# Patient Record
Sex: MALE | Race: BLACK OR AFRICAN AMERICAN | NOT HISPANIC OR LATINO | Employment: FULL TIME | URBAN - METROPOLITAN AREA
[De-identification: names, ages, dates, MRNs, and addresses within clinical notes are randomized per-mention and may not be internally consistent; named-entity substitution may affect disease eponyms.]

---

## 2017-01-04 ENCOUNTER — ANESTHESIA EVENT (OUTPATIENT)
Dept: SURGERY | Facility: OTHER | Age: 52
End: 2017-01-04
Payer: COMMERCIAL

## 2017-01-04 ENCOUNTER — HOSPITAL ENCOUNTER (OUTPATIENT)
Dept: PREADMISSION TESTING | Facility: OTHER | Age: 52
Discharge: HOME | End: 2017-01-04
Attending: SPECIALIST
Payer: COMMERCIAL

## 2017-01-04 VITALS
OXYGEN SATURATION: 97 % | DIASTOLIC BLOOD PRESSURE: 80 MMHG | HEART RATE: 88 BPM | HEIGHT: 69 IN | BODY MASS INDEX: 34.8 KG/M2 | TEMPERATURE: 99 F | SYSTOLIC BLOOD PRESSURE: 142 MMHG | WEIGHT: 235 LBS | RESPIRATION RATE: 16 BRPM

## 2017-01-04 DIAGNOSIS — Q82.8 ACCESSORY SKIN TAGS: ICD-10-CM

## 2017-01-04 DIAGNOSIS — D48.7 NEOPLASM OF UNCERTAIN BEHAVIOR OF NECK: ICD-10-CM

## 2017-01-04 LAB
ANION GAP SERPL CALC-SCNC: 9 MMOL/L
BASOPHILS # BLD AUTO: 0.02 K/UL
BASOPHILS NFR BLD: 0.3 %
BUN SERPL-MCNC: 12 MG/DL
CALCIUM SERPL-MCNC: 9.6 MG/DL
CHLORIDE SERPL-SCNC: 103 MMOL/L
CO2 SERPL-SCNC: 27 MMOL/L
CREAT SERPL-MCNC: 1.1 MG/DL
DIFFERENTIAL METHOD: NORMAL
EOSINOPHIL # BLD AUTO: 0.2 K/UL
EOSINOPHIL NFR BLD: 3.7 %
ERYTHROCYTE [DISTWIDTH] IN BLOOD BY AUTOMATED COUNT: 13.2 %
EST. GFR  (AFRICAN AMERICAN): >60 ML/MIN/1.73 M^2
EST. GFR  (NON AFRICAN AMERICAN): >60 ML/MIN/1.73 M^2
GLUCOSE SERPL-MCNC: 84 MG/DL
HCT VFR BLD AUTO: 48.1 %
HGB BLD-MCNC: 17 G/DL
LYMPHOCYTES # BLD AUTO: 1.3 K/UL
LYMPHOCYTES NFR BLD: 20.2 %
MCH RBC QN AUTO: 30.1 PG
MCHC RBC AUTO-ENTMCNC: 35.3 %
MCV RBC AUTO: 85 FL
MONOCYTES # BLD AUTO: 0.5 K/UL
MONOCYTES NFR BLD: 7.7 %
NEUTROPHILS # BLD AUTO: 4.4 K/UL
NEUTROPHILS NFR BLD: 67.9 %
PLATELET # BLD AUTO: 164 K/UL
PMV BLD AUTO: 11.2 FL
POTASSIUM SERPL-SCNC: 3.7 MMOL/L
RBC # BLD AUTO: 5.64 M/UL
SODIUM SERPL-SCNC: 139 MMOL/L
WBC # BLD AUTO: 6.48 K/UL

## 2017-01-04 PROCEDURE — 36415 COLL VENOUS BLD VENIPUNCTURE: CPT

## 2017-01-04 PROCEDURE — 80048 BASIC METABOLIC PNL TOTAL CA: CPT

## 2017-01-04 PROCEDURE — 85025 COMPLETE CBC W/AUTO DIFF WBC: CPT

## 2017-01-04 RX ORDER — WARFARIN 2.5 MG/1
2.5 TABLET ORAL
Status: ON HOLD | COMMUNITY
End: 2017-11-02 | Stop reason: HOSPADM

## 2017-01-04 RX ORDER — FAMOTIDINE 10 MG/1
10 TABLET ORAL 2 TIMES DAILY
COMMUNITY
End: 2017-08-30

## 2017-01-04 RX ORDER — TRAMADOL HYDROCHLORIDE 50 MG/1
50 TABLET ORAL EVERY 6 HOURS PRN
Status: ON HOLD | COMMUNITY
End: 2017-11-02 | Stop reason: HOSPADM

## 2017-01-04 RX ORDER — SODIUM CHLORIDE, SODIUM LACTATE, POTASSIUM CHLORIDE, CALCIUM CHLORIDE 600; 310; 30; 20 MG/100ML; MG/100ML; MG/100ML; MG/100ML
INJECTION, SOLUTION INTRAVENOUS CONTINUOUS
Status: CANCELLED | OUTPATIENT
Start: 2017-01-04

## 2017-01-04 RX ORDER — LIDOCAINE HYDROCHLORIDE 10 MG/ML
1 INJECTION, SOLUTION EPIDURAL; INFILTRATION; INTRACAUDAL; PERINEURAL ONCE
Status: CANCELLED | OUTPATIENT
Start: 2017-01-04 | End: 2017-01-04

## 2017-01-04 RX ORDER — WARFARIN SODIUM 5 MG/1
5 TABLET ORAL
Status: ON HOLD | COMMUNITY
End: 2017-11-02 | Stop reason: HOSPADM

## 2017-01-04 NOTE — IP AVS SNAPSHOT
[facility] Location ([place]) [address] [address]  Phone: [phone]           I have received a copy of my After Visit Summary and discharge instructions from [facility].    INSTRUCTIONS RECEIVED AND UNDERSTOOD BY:                     Patient/Patient Representative: ________________________________________________________________     Date/Time: ________________________________________________________________                     Instructions Given By: ________________________________________________________________     Date/Time: ________________________________________________________________

## 2017-01-04 NOTE — ANESTHESIA PREPROCEDURE EVALUATION
01/04/2017  Rangel Garcia is a 51 y.o., male.    [facility] Anesthesia Evaluation    I have reviewed the Patient Summary Reports.    I have reviewed the Nursing Notes.   I have reviewed the Medications.     Review of Systems  Anesthesia Hx:  Denies Family Hx of Anesthesia complications.   Denies Personal Hx of Anesthesia complications.   Social:  Non-Smoker    Hematology/Oncology:  Hematology Normal   Oncology Normal     EENT/Dental:EENT/Dental Normal   Cardiovascular:   Hypertension DVT's tx with chronic coumadin. Has ariel filter in place. On lovenox bridge pre-op   Pulmonary:  Pulmonary Normal    Renal/:  Renal/ Normal     Hepatic/GI:   GERD    Musculoskeletal:  Musculoskeletal Normal    Neurological:   Hypersensitivity on L arm following ulnar n surgery   Endocrine:  Endocrine Normal    Dermatological:  Skin Normal    Psych:  Psychiatric Normal           Physical Exam  General:  Well nourished    Airway/Jaw/Neck:  Airway Findings: Mouth Opening: Normal Mallampati: I  TM Distance: Normal, at least 6 cm  Jaw/Neck Findings:  Neck ROM: Extension Decreased, Mild      Dental:  Dental Findings: In tact        Mental Status:  Mental Status Findings:  Cooperative, Alert and Oriented         Anesthesia Plan  Type of Anesthesia, risks & benefits discussed:  Anesthesia Type:  general  Patient's Preference:   Intra-op Monitoring Plan:   Intra-op Monitoring Plan Comments:   Post Op Pain Control Plan:   Post Op Pain Control Plan Comments:   Induction:    Beta Blocker:         Informed Consent: Patient understands risks and agrees with Anesthesia plan.  Questions answered. Anesthesia consent signed with patient.  ASA Score: 3     Day of Surgery Review of History & Physical:    H&P update referred to the surgeon.     Anesthesia Plan Notes: Labs ordered today in [facility]    Consider avoiding L arm for BP cuff due to  hypersensitivity there    ?prone?        Ready For Surgery From Anesthesia Perspective.

## 2017-01-04 NOTE — DISCHARGE INSTRUCTIONS
PRE-ADMIT TESTING -  [phone]    [address]  [facility]        OUTPATIENT SURGERY UNIT - [phone]    Your surgery has been scheduled at [facility]. We are pleased to have the opportunity to serve you. For Further Information please call [phone].    On the day of surgery please report to the Information Desk on the 1st floor.    CONTACT YOUR PHYSICIAN'S OFFICE THE DAY PRIOR TO YOUR SURGERY TO OBTAIN YOUR ARRIVAL TIME.     The evening before surgery do not eat anything after 9 p.m. ( this includes hard candy, chewing gum and mints).  You may have GATORADE, POWERADE AND WATER FROM 9 p.m. until leaving home to come to the hospital.   DO NOT DRINK ANY LIQUIDS ON THE WAY TO THE HOSPITAL.     SPECIAL MEDICATION INSTRUCTIONS: TAKE medications checked off by the Anesthesiologist on your Medication List.    Angiogram Patients: Take medications as instructed by your physician, including aspirin.     Surgery Patients:    If you take ASPIRIN - Your PHYSICIAN/SURGEON will need to inform you IF/OR when you need to stop taking aspirin prior to your surgery.     Do Not take any medications containing IBUPROFEN.  Do Not Wear any make-up ( especially eye make-up) or dark nail polish.    Do not shave your surgical area at least 5 days prior to your surgery. The surgical prep will be performed at the hospital according to Infection Control regulations.    Leave all valuables at home.   Do Not wear any jewelry or watches, including any metal in body piercings.  Contact Lens must be removed before surgery. Either do not wear the contact lens or bring a case and solution for storage.  Please bring a container for eyeglasses or dentures as required.  Bring any paperwork your physician has provided, such as consent forms,  history and physicals, doctor's orders, etc.   Bring comfortable clothes that are loose fitting to wear upon discharge. Take into consideration the type of surgery being  performed.  Maintain your diet as advised per your physician the day prior to surgery.  Adequate rest the night before surgery is advised.   Park in the Parking lot behind the hospital or in the [place] across the street from the parking lot. Parking is complimentary.  If you will be discharged the same day as your procedure, please arrange for a responsible adult to drive you home or to accompany you if traveling by taxi.   YOU WILL NOT BE PERMITTED TO DRIVE OR TO LEAVE THE HOSPITAL ALONE AFTER SURGERY.   It is strongly recommended that you arrange for someone to remain with you for the first 24 hrs following your surgery.       Thank you for your cooperation.  The Staff of [facility].        Bathing Instructions                                                                 Please shower the evening before and morning of your procedure with    ANTIBACTERIAL SOAP. ( DIAL, etc )  Concentrate on the surgical area   for at least 3 minutes and rinse completely. Dry off as usual.                            No lotions or creams.

## 2017-01-04 NOTE — IP AVS SNAPSHOT
[facility] Location ([place])  [address] [place] [address]  Phone: [phone]           Patient Discharge Instructions    Our goal is to set you up for success. This packet includes information on your condition, medications, and your home care. It will help you to care for yourself so you don't get sicker.     Please ask your nurse if you have any questions.        There are many details to remember when preparing for your surgery. Here is what you will need to do, please ask your nurse if there are more specific instructions and if you have any questions:    1. 24 hours before procedure Do not smoke or drink alcoholic beverages 24 hours prior to your procedure    2. Eating before procedure Do not eat or drink anything 8 hours before your procedure - this includes gum, mints, and candy.     3. Day of procedure Please remove all jewelry for the procedure. If you wear contact lenses, dentures, hearing aids or glasses, bring a container to put them in during your surgery and give to a family member for safekeeping.  If your doctor has scheduled you for an overnight stay, bring a small overnight bag with any personal items that you need.    4. After procedure Make arrangements in advance for transportation home by a responsible adult. It is not safe to drive a vehicle during the 24 hours following surgery.     PLEASE NOTE: You may be contacted the day before your surgery to confirm your surgery date and arrival time. The Surgery schedule has many variables which may affect the time of your surgery case. Family members should be available if your surgery time changes.                ** Verify the list of medication(s) below is accurate and up to date. Carry this with you in case of emergency. If your medications have changed, please notify your healthcare provider.             Medication List      TAKE these medications        Additional Info                      acyclovir 200 MG capsule    Commonly known as:  ZOVIRAX   Refills:  0      Begin Date    AM    Noon    PM    Bedtime       amlodipine 5 MG tablet   Commonly known as:  NORVASC   Refills:  0   Dose:  5 mg    Instructions:  Take 5 mg by mouth once daily.     Begin Date    AM    Noon    PM    Bedtime       DEPO-TESTOSTERONE 200 mg/mL injection   Refills:  0   Generic drug:  testosterone cypionate    Instructions:  INJECT 1 ML EVERY OTHER WEEK     Begin Date    AM    Noon    PM    Bedtime       enoxaparin 60 mg/0.6 mL Syrg   Commonly known as:  LOVENOX   Quantity:  0.6 mL   Refills:  1   Dose:  60 mg    Instructions:  Inject 0.6 mLs (60 mg total) into the skin every 12 (twelve) hours.     Begin Date    AM    Noon    PM    Bedtime       famotidine 10 MG tablet   Commonly known as:  PEPCID   Refills:  0   Dose:  10 mg    Instructions:  Take 10 mg by mouth 2 (two) times daily.     Begin Date    AM    Noon    PM    Bedtime       gabapentin 300 MG capsule   Commonly known as:  NEURONTIN   Refills:  0   Dose:  300 mg    Instructions:  Take 300 mg by mouth 2 (two) times daily.     Begin Date    AM    Noon    PM    Bedtime       methocarbamol 750 MG Tab   Commonly known as:  ROBAXIN   Refills:  0   Dose:  500 mg    Instructions:  Take 500 mg by mouth 4 (four) times daily.     Begin Date    AM    Noon    PM    Bedtime       nebivolol 5 MG Tab   Commonly known as:  BYSTOLIC   Refills:  0   Dose:  10 mg    Instructions:  Take 10 mg by mouth once daily.     Begin Date    AM    Noon    PM    Bedtime       PENNSAID 20 mg/gram /actuation(2 %) Sopm   Refills:  3   Generic drug:  diclofenac sodium    Instructions:  APPLY TWO PUMPS TO THE AFFECTED KNEE TWICE DAILY.     Begin Date    AM    Noon    PM    Bedtime       tramadol 50 mg tablet   Commonly known as:  ULTRAM   Refills:  0   Dose:  50 mg    Instructions:  Take 50 mg by mouth every 6 (six) hours as needed for Pain.     Begin Date    AM    Noon    PM    Bedtime       valsartan 40 MG tablet   Commonly known  as:  DIOVAN   Refills:  0   Dose:  25 mg    Instructions:  Take 25 mg by mouth once daily.     Begin Date    AM    Noon    PM    Bedtime       * warfarin 5 MG tablet   Commonly known as:  COUMADIN   Refills:  0   Dose:  5 mg   Indications:  Deep Venous Thrombosis    Instructions:  Take 5 mg by mouth every Tues, Thurs, Sat. q day / sat 2.5mg     Begin Date    AM    Noon    PM    Bedtime       * warfarin 2.5 MG tablet   Commonly known as:  COUMADIN   Refills:  0   Dose:  2.5 mg    Instructions:  Take 2.5 mg by mouth every Saturday.     Begin Date    AM    Noon    PM    Bedtime       * Notice:  This list has 2 medication(s) that are the same as other medications prescribed for you. Read the directions carefully, and ask your doctor or other care provider to review them with you.               Please bring to all follow up appointments:    1. A copy of your discharge instructions.  2. All medicines you are currently taking in their original bottles.  3. Identification and insurance card.    Please arrive 15 minutes ahead of scheduled appointment time.    Please call 24 hours in advance if you must reschedule your appointment and/or time.        Your Future Surgeries/Procedures     Jan 06, 2017   Surgery with Nathan Choudhury Jr., MD   [facility] ([facility])    [address] [place] [id number] [phone]                  Discharge Instructions       PRE-ADMIT TESTING -  [phone]    [address] [place] [address]        OUTPATIENT SURGERY UNIT - [phone]    Your surgery has been scheduled at [facility]. We are pleased to have the opportunity to serve you. For Further Information please call [phone].    On the day of surgery please report to the Information Desk on the 1st floor.    CONTACT YOUR PHYSICIAN'S OFFICE THE DAY PRIOR TO YOUR SURGERY TO OBTAIN YOUR ARRIVAL TIME.     The evening before surgery do not eat anything after 9 p.m. ( this  includes hard candy, chewing gum and mints).  You may have GATORADE, POWERADE AND WATER FROM 9 p.m. until leaving home to come to the hospital.   DO NOT DRINK ANY LIQUIDS ON THE WAY TO THE HOSPITAL.     SPECIAL MEDICATION INSTRUCTIONS: TAKE medications checked off by the Anesthesiologist on your Medication List.    Angiogram Patients: Take medications as instructed by your physician, including aspirin.     Surgery Patients:    If you take ASPIRIN - Your PHYSICIAN/SURGEON will need to inform you IF/OR when you need to stop taking aspirin prior to your surgery.     Do Not take any medications containing IBUPROFEN.  Do Not Wear any make-up ( especially eye make-up) or dark nail polish.    Do not shave your surgical area at least 5 days prior to your surgery. The surgical prep will be performed at the hospital according to Infection Control regulations.    Leave all valuables at home.   Do Not wear any jewelry or watches, including any metal in body piercings.  Contact Lens must be removed before surgery. Either do not wear the contact lens or bring a case and solution for storage.  Please bring a container for eyeglasses or dentures as required.  Bring any paperwork your physician has provided, such as consent forms,  history and physicals, doctor's orders, etc.   Bring comfortable clothes that are loose fitting to wear upon discharge. Take into consideration the type of surgery being performed.  Maintain your diet as advised per your physician the day prior to surgery.  Adequate rest the night before surgery is advised.   Park in the Parking lot behind the hospital or in the [place] [facility] across the street from the parking lot. Parking is complimentary.  If you will be discharged the same day as your procedure, please arrange for a responsible adult to drive you home or to accompany you if traveling by taxi.   YOU WILL NOT BE PERMITTED TO DRIVE OR TO LEAVE THE HOSPITAL ALONE AFTER SURGERY.   It is strongly  "recommended that you arrange for someone to remain with you for the first 24 hrs following your surgery.       Thank you for your cooperation.  The Staff of [facility].        Bathing Instructions                                                                 Please shower the evening before and morning of your procedure with    ANTIBACTERIAL SOAP. ( DIAL, etc )  Concentrate on the surgical area   for at least 3 minutes and rinse completely. Dry off as usual.                            No lotions or creams.                        Admission Information     Date & Time Provider Department CSN    1/4/2017 10:30 AM Nathan Choudhury Jr., MD [facility] [id number]      Care Providers     Provider Role Specialty Primary office phone    Nathan Choudhury Jr., MD Attending Provider Vascular Surgery [phone]      Your Vitals Were     BP Pulse Temp Resp Height Weight    142/80 88 98.5 °F (36.9 °C) 16 5' 9" (1.753 m) 106.6 kg (235 lb)    SpO2 BMI             97% 34.7 kg/m2         Recent Lab Values     No lab values to display.      Allergies as of 1/4/2017        Reactions    Morphine Itching      [facility]     [facility] Nurse Care Line - 24/7 Assistance  Unless otherwise directed by your provider, please contact [facility], our nurse care line that is available for 24/7 assistance.     Registered nurses in the [facility] provide clinical advisement, health education, appointment booking, and other advisory services.  Call for this free service at [phone].        Advance Directives     An advance directive is a document which, in the event you are no longer able to make decisions for yourself, tells your healthcare team what kind of treatment you do or do not want to receive, or who you would like to make those decisions for you.  If you do not currently have an advance directive, [facility] encourages you to create one.  For more information call:  [phone]), [phone] ([phone]),  or log on to [email or web address].        Language Assistance Services     ATTENTION: Language assistance services are available, free of charge. Please call [phone].      ATENCIÓN: Si habla ilir, tiene a pollock disposición servicios gratuitos de asistencia lingüística. Llame al [phone].     CHÚ Ý: N?u b?n nói Ti?ng Vi?t, có các d?ch v? h? tr? ngôn ng? mi?n phí dành cho b?n. G?i s? [phone].        Coumadin Discharge Instructions                         [facility] Sign-Up     Activating your [facility] account is as easy as 1-2-3!     1) Visit [email or web address], select Sign Up Now, enter this activation code and your date of birth, then select Next.  [email or web address]  Expires: 2/18/2017 11:19 AM      2) Create a username and password to use when you visit [facility] in the future and select a security question in case you lose your password and select Next.    3) Enter your e-mail address and click Sign Up!    Additional Information  If you have questions, please e-mail [email or web address] or call [phone] to talk to our [facility] staff. Remember, [facility] is NOT to be used for urgent needs. For medical emergencies, dial 911.          [facility] complies with applicable Federal civil rights laws and does not discriminate on the basis of race, color, national origin, age, disability, or sex.

## 2017-01-06 ENCOUNTER — SURGERY (OUTPATIENT)
Age: 52
End: 2017-01-06

## 2017-01-06 ENCOUNTER — ANESTHESIA (OUTPATIENT)
Dept: SURGERY | Facility: OTHER | Age: 52
End: 2017-01-06
Payer: COMMERCIAL

## 2017-01-06 PROBLEM — D48.7 NEOPLASM OF UNCERTAIN BEHAVIOR OF NECK: Status: ACTIVE | Noted: 2017-01-06

## 2017-01-06 PROCEDURE — 25000003 PHARM REV CODE 250: Performed by: NURSE ANESTHETIST, CERTIFIED REGISTERED

## 2017-01-06 PROCEDURE — 63600175 PHARM REV CODE 636 W HCPCS: Performed by: SPECIALIST

## 2017-01-06 PROCEDURE — 25000003 PHARM REV CODE 250: Performed by: ANESTHESIOLOGY

## 2017-01-06 PROCEDURE — 63600175 PHARM REV CODE 636 W HCPCS: Performed by: NURSE ANESTHETIST, CERTIFIED REGISTERED

## 2017-01-06 RX ORDER — LIDOCAINE HCL/PF 100 MG/5ML
SYRINGE (ML) INTRAVENOUS
Status: DISCONTINUED | OUTPATIENT
Start: 2017-01-06 | End: 2017-01-06

## 2017-01-06 RX ORDER — NEOSTIGMINE METHYLSULFATE 1 MG/ML
INJECTION, SOLUTION INTRAVENOUS
Status: DISCONTINUED | OUTPATIENT
Start: 2017-01-06 | End: 2017-01-06

## 2017-01-06 RX ORDER — PHENYLEPHRINE HYDROCHLORIDE 10 MG/ML
INJECTION INTRAVENOUS
Status: DISCONTINUED | OUTPATIENT
Start: 2017-01-06 | End: 2017-01-06

## 2017-01-06 RX ORDER — DEXAMETHASONE SODIUM PHOSPHATE 4 MG/ML
INJECTION, SOLUTION INTRA-ARTICULAR; INTRALESIONAL; INTRAMUSCULAR; INTRAVENOUS; SOFT TISSUE
Status: DISCONTINUED | OUTPATIENT
Start: 2017-01-06 | End: 2017-01-06

## 2017-01-06 RX ORDER — FENTANYL CITRATE 50 UG/ML
INJECTION, SOLUTION INTRAMUSCULAR; INTRAVENOUS
Status: DISCONTINUED | OUTPATIENT
Start: 2017-01-06 | End: 2017-01-06

## 2017-01-06 RX ORDER — PROPOFOL 10 MG/ML
VIAL (ML) INTRAVENOUS
Status: DISCONTINUED | OUTPATIENT
Start: 2017-01-06 | End: 2017-01-06

## 2017-01-06 RX ORDER — GLYCOPYRROLATE 0.2 MG/ML
INJECTION INTRAMUSCULAR; INTRAVENOUS
Status: DISCONTINUED | OUTPATIENT
Start: 2017-01-06 | End: 2017-01-06

## 2017-01-06 RX ORDER — ROCURONIUM BROMIDE 10 MG/ML
INJECTION, SOLUTION INTRAVENOUS
Status: DISCONTINUED | OUTPATIENT
Start: 2017-01-06 | End: 2017-01-06

## 2017-01-06 RX ORDER — ACETAMINOPHEN 10 MG/ML
INJECTION, SOLUTION INTRAVENOUS
Status: DISCONTINUED | OUTPATIENT
Start: 2017-01-06 | End: 2017-01-06

## 2017-01-06 RX ORDER — MIDAZOLAM HYDROCHLORIDE 1 MG/ML
INJECTION INTRAMUSCULAR; INTRAVENOUS
Status: DISCONTINUED | OUTPATIENT
Start: 2017-01-06 | End: 2017-01-06

## 2017-01-06 RX ORDER — ONDANSETRON 2 MG/ML
INJECTION INTRAMUSCULAR; INTRAVENOUS
Status: DISCONTINUED | OUTPATIENT
Start: 2017-01-06 | End: 2017-01-06

## 2017-01-06 RX ADMIN — GLYCOPYRROLATE 0.2 MG: 0.2 INJECTION, SOLUTION INTRAMUSCULAR; INTRAVENOUS at 07:01

## 2017-01-06 RX ADMIN — LIDOCAINE HYDROCHLORIDE 75 MG: 20 INJECTION, SOLUTION INTRAVENOUS at 07:01

## 2017-01-06 RX ADMIN — FENTANYL CITRATE 100 MCG: 50 INJECTION, SOLUTION INTRAMUSCULAR; INTRAVENOUS at 07:01

## 2017-01-06 RX ADMIN — PHENYLEPHRINE HYDROCHLORIDE 100 MCG: 10 INJECTION INTRAVENOUS at 08:01

## 2017-01-06 RX ADMIN — ACETAMINOPHEN 1000 MG: 10 INJECTION, SOLUTION INTRAVENOUS at 07:01

## 2017-01-06 RX ADMIN — ROCURONIUM BROMIDE 50 MG: 10 INJECTION, SOLUTION INTRAVENOUS at 07:01

## 2017-01-06 RX ADMIN — PROPOFOL 25 MG: 10 INJECTION, EMULSION INTRAVENOUS at 07:01

## 2017-01-06 RX ADMIN — GLYCOPYRROLATE 0.8 MG: 0.2 INJECTION, SOLUTION INTRAMUSCULAR; INTRAVENOUS at 08:01

## 2017-01-06 RX ADMIN — FENTANYL CITRATE 50 MCG: 50 INJECTION, SOLUTION INTRAMUSCULAR; INTRAVENOUS at 07:01

## 2017-01-06 RX ADMIN — MIDAZOLAM HYDROCHLORIDE 2 MG: 1 INJECTION, SOLUTION INTRAMUSCULAR; INTRAVENOUS at 07:01

## 2017-01-06 RX ADMIN — PHENYLEPHRINE HYDROCHLORIDE 200 MCG: 10 INJECTION INTRAVENOUS at 08:01

## 2017-01-06 RX ADMIN — CEFAZOLIN SODIUM 2 G: 2 SOLUTION INTRAVENOUS at 07:01

## 2017-01-06 RX ADMIN — PROPOFOL 175 MG: 10 INJECTION, EMULSION INTRAVENOUS at 07:01

## 2017-01-06 RX ADMIN — LIDOCAINE HYDROCHLORIDE 25 MG: 20 INJECTION, SOLUTION INTRAVENOUS at 07:01

## 2017-01-06 RX ADMIN — BUPIVACAINE HYDROCHLORIDE AND EPINEPHRINE BITARTRATE 20 ML: 2.5; .005 INJECTION, SOLUTION INFILTRATION; PERINEURAL at 08:01

## 2017-01-06 RX ADMIN — DEXAMETHASONE SODIUM PHOSPHATE 8 MG: 4 INJECTION, SOLUTION INTRAMUSCULAR; INTRAVENOUS at 07:01

## 2017-01-06 RX ADMIN — FENTANYL CITRATE 100 MCG: 50 INJECTION, SOLUTION INTRAMUSCULAR; INTRAVENOUS at 09:01

## 2017-01-06 RX ADMIN — CARBOXYMETHYLCELLULOSE SODIUM 2 DROP: 2.5 SOLUTION/ DROPS OPHTHALMIC at 07:01

## 2017-01-06 RX ADMIN — ONDANSETRON 8 MG: 2 INJECTION INTRAMUSCULAR; INTRAVENOUS at 07:01

## 2017-01-06 RX ADMIN — NEOSTIGMINE METHYLSULFATE 5 MG: 1 INJECTION INTRAVENOUS at 08:01

## 2017-01-06 RX ADMIN — SODIUM CHLORIDE, SODIUM LACTATE, POTASSIUM CHLORIDE, AND CALCIUM CHLORIDE: 600; 310; 30; 20 INJECTION, SOLUTION INTRAVENOUS at 06:01

## 2017-01-06 RX ADMIN — FENTANYL CITRATE 100 MCG: 50 INJECTION, SOLUTION INTRAMUSCULAR; INTRAVENOUS at 08:01

## 2017-01-06 NOTE — TRANSFER OF CARE
"Anesthesia Transfer of Care Note    Patient: Rangel Garcia    Procedure(s) Performed: Procedure(s):  EXCISION-MASS BACK OF NECK    Patient location: PACU    Anesthesia Type: general    Transport from OR: Transported from OR on 2-3 L/min O2 by NC with adequate spontaneous ventilation    Post pain: adequate analgesia    Post assessment: no apparent anesthetic complications    Post vital signs: stable    Level of consciousness: awake, alert and oriented    Nausea/Vomiting: no nausea/vomiting    Complications: none          Last vitals:   Visit Vitals    /81 (BP Location: Left arm, Patient Position: Lying, BP Method: Automatic)    Pulse 77    Temp 36.7 °C (98.1 °F) (Oral)    Resp 16    Ht 5' 9" (1.753 m)    Wt 106.6 kg (235 lb)    SpO2 95%    BMI 34.7 kg/m2     "

## 2017-01-06 NOTE — ANESTHESIA POSTPROCEDURE EVALUATION
"Anesthesia Post Evaluation    Patient: Rangel Garcai    Procedure(s) Performed: Procedure(s):  EXCISION-MASS BACK OF NECK    Final Anesthesia Type: general  Patient location during evaluation: PACU  Patient participation: Yes- Able to Participate  Level of consciousness: awake and alert  Post-procedure vital signs: reviewed and stable  Pain management: adequate  Airway patency: patent  PONV status at discharge: No PONV  Anesthetic complications: no      Cardiovascular status: hemodynamically stable  Respiratory status: unassisted and spontaneous ventilation  Hydration status: euvolemic  Follow-up not needed.        Visit Vitals    BP (!) 150/83 (BP Location: Right arm, Patient Position: Lying, BP Method: Automatic)    Pulse 87    Temp 36.4 °C (97.6 °F) (Oral)    Resp 18    Ht 5' 9" (1.753 m)    Wt 106.6 kg (235 lb)    SpO2 100%    BMI 34.7 kg/m2       Pain/Monet Score: Pain Assessment Performed: Yes (1/6/2017  6:07 AM)  Presence of Pain: denies (1/6/2017  6:07 AM)      "

## 2017-10-24 ENCOUNTER — TELEPHONE (OUTPATIENT)
Dept: SURGERY | Facility: OTHER | Age: 52
End: 2017-10-24

## 2017-10-27 ENCOUNTER — ANESTHESIA EVENT (OUTPATIENT)
Dept: SURGERY | Facility: OTHER | Age: 52
End: 2017-10-27
Payer: OTHER MISCELLANEOUS

## 2017-10-27 ENCOUNTER — HOSPITAL ENCOUNTER (OUTPATIENT)
Dept: PREADMISSION TESTING | Facility: OTHER | Age: 52
Discharge: HOME | End: 2017-10-27
Attending: SPECIALIST
Payer: OTHER MISCELLANEOUS

## 2017-10-27 VITALS
TEMPERATURE: 99 F | DIASTOLIC BLOOD PRESSURE: 80 MMHG | BODY MASS INDEX: 33.33 KG/M2 | HEIGHT: 69 IN | SYSTOLIC BLOOD PRESSURE: 139 MMHG | RESPIRATION RATE: 16 BRPM | OXYGEN SATURATION: 98 % | HEART RATE: 68 BPM | WEIGHT: 225 LBS

## 2017-10-27 LAB
ANION GAP SERPL CALC-SCNC: 8 MMOL/L
BASOPHILS # BLD AUTO: 0 K/UL
BASOPHILS NFR BLD: 0 %
BUN SERPL-MCNC: 16 MG/DL
CALCIUM SERPL-MCNC: 9.5 MG/DL
CHLORIDE SERPL-SCNC: 103 MMOL/L
CO2 SERPL-SCNC: 28 MMOL/L
CREAT SERPL-MCNC: 1 MG/DL
DIFFERENTIAL METHOD: ABNORMAL
EOSINOPHIL # BLD AUTO: 0 K/UL
EOSINOPHIL NFR BLD: 0.1 %
ERYTHROCYTE [DISTWIDTH] IN BLOOD BY AUTOMATED COUNT: 13.2 %
EST. GFR  (AFRICAN AMERICAN): >60 ML/MIN/1.73 M^2
EST. GFR  (NON AFRICAN AMERICAN): >60 ML/MIN/1.73 M^2
GLUCOSE SERPL-MCNC: 111 MG/DL
HCT VFR BLD AUTO: 46.8 %
HGB BLD-MCNC: 16.3 G/DL
INR PPP: 1.3
LYMPHOCYTES # BLD AUTO: 0.5 K/UL
LYMPHOCYTES NFR BLD: 6.9 %
MCH RBC QN AUTO: 29.9 PG
MCHC RBC AUTO-ENTMCNC: 34.8 G/DL
MCV RBC AUTO: 86 FL
MONOCYTES # BLD AUTO: 0.1 K/UL
MONOCYTES NFR BLD: 1.8 %
NEUTROPHILS # BLD AUTO: 6.1 K/UL
NEUTROPHILS NFR BLD: 91.1 %
PLATELET # BLD AUTO: 179 K/UL
PMV BLD AUTO: 10.7 FL
POTASSIUM SERPL-SCNC: 4.2 MMOL/L
PROTHROMBIN TIME: 14 SEC
RBC # BLD AUTO: 5.46 M/UL
SODIUM SERPL-SCNC: 139 MMOL/L
WBC # BLD AUTO: 6.68 K/UL

## 2017-10-27 PROCEDURE — 85610 PROTHROMBIN TIME: CPT

## 2017-10-27 PROCEDURE — 80048 BASIC METABOLIC PNL TOTAL CA: CPT

## 2017-10-27 PROCEDURE — 36415 COLL VENOUS BLD VENIPUNCTURE: CPT

## 2017-10-27 PROCEDURE — 85025 COMPLETE CBC W/AUTO DIFF WBC: CPT

## 2017-10-27 RX ORDER — SODIUM CHLORIDE, SODIUM LACTATE, POTASSIUM CHLORIDE, CALCIUM CHLORIDE 600; 310; 30; 20 MG/100ML; MG/100ML; MG/100ML; MG/100ML
INJECTION, SOLUTION INTRAVENOUS CONTINUOUS
Status: CANCELLED | OUTPATIENT
Start: 2017-10-27

## 2017-10-27 RX ORDER — ENOXAPARIN SODIUM 150 MG/ML
1 INJECTION SUBCUTANEOUS
Status: ON HOLD | COMMUNITY
End: 2017-11-02 | Stop reason: HOSPADM

## 2017-10-27 RX ORDER — FAMOTIDINE 20 MG/1
20 TABLET, FILM COATED ORAL
Status: CANCELLED | OUTPATIENT
Start: 2017-10-27 | End: 2017-10-27

## 2017-10-27 RX ORDER — VALSARTAN AND HYDROCHLOROTHIAZIDE 320; 12.5 MG/1; MG/1
1 TABLET, FILM COATED ORAL DAILY
Status: ON HOLD | COMMUNITY
End: 2017-11-02 | Stop reason: HOSPADM

## 2017-10-27 RX ORDER — MIDAZOLAM HYDROCHLORIDE 5 MG/ML
5 INJECTION INTRAMUSCULAR; INTRAVENOUS
Status: DISCONTINUED | OUTPATIENT
Start: 2017-11-02 | End: 2017-10-28 | Stop reason: HOSPADM

## 2017-10-27 NOTE — DISCHARGE INSTRUCTIONS
PRE-ADMIT TESTING -  [phone]    [address]  [facility]          Your surgery has been scheduled at [facility]. We are pleased to have the opportunity to serve you. For Further Information please call [phone].    On the day of surgery please report to the Information Desk on the 1st floor.    · CONTACT YOUR PHYSICIAN'S OFFICE THE DAY PRIOR TO YOUR SURGERY TO OBTAIN YOUR ARRIVAL TIME.     · The evening before surgery do not eat anything after 9 p.m. ( this includes hard candy, chewing gum and mints).  You may only have GATORADE, POWERADE AND WATER  from 9 p.m. until you leave your home.   DO NOT DRINK ANY LIQUIDS ON THE WAY TO THE HOSPITAL.      SPECIAL MEDICATION INSTRUCTIONS: TAKE medications checked off by the Anesthesiologist on your Medication List.    Angiogram Patients: Take medications as instructed by your physician, including aspirin.     Surgery Patients:    If you take ASPIRIN - Your PHYSICIAN/SURGEON will need to inform you IF/OR when you need to stop taking aspirin prior to your surgery.     Do Not take any medications containing IBUPROFEN.  Do Not Wear any make-up or dark nail polish   (especially eye make-up) to surgery. If you come to surgery with makeup on you will be required to remove the makeup or nail polish.    Do not shave your surgical area at least 5 days prior to your surgery. The surgical prep will be performed at the hospital according to Infection Control regulations.    Leave all valuables at home.   Do Not wear any jewelry or watches, including any metal in body piercings.  Contact Lens must be removed before surgery. Either do not wear the contact lens or bring a case and solution for storage.  Please bring a container for eyeglasses or dentures as required.  Bring any paperwork your physician has provided, such as consent forms,  history and physicals, doctor's orders, etc.   Bring comfortable clothes that are loose fitting to wear upon  discharge. Take into consideration the type of surgery being performed.  Maintain your diet as advised per your physician the day prior to surgery.      Adequate rest the night before surgery is advised.   Park in the Parking lot behind the hospital or in the [place] [facility] across the street from the parking lot. Parking is complimentary.  If you will be discharged the same day as your procedure, please arrange for a responsible adult to drive you home or to accompany you if traveling by taxi.   YOU WILL NOT BE PERMITTED TO DRIVE OR TO LEAVE THE HOSPITAL ALONE AFTER SURGERY.   It is strongly recommended that you arrange for someone to remain with you for the first 24 hrs following your surgery.       Thank you for your cooperation.  The Staff of [facility].        Bathing Instructions                                                                 Please shower the evening before and morning of your procedure with    ANTIBACTERIAL SOAP. ( DIAL, etc )  Concentrate on the surgical area   for at least 3 minutes and rinse completely. Dry off as usual.   Do not use any deodorant, powder, body lotions, perfume, after shave or    cologne.

## 2017-10-27 NOTE — ANESTHESIA PREPROCEDURE EVALUATION
10/27/2017  Rangel Garcia is a 51 y.o., male.    Anesthesia Evaluation    I have reviewed the Patient Summary Reports.    I have reviewed the Nursing Notes.   I have reviewed the Medications.     Review of Systems  Anesthesia Hx:  Denies Family Hx of Anesthesia complications.   Denies Personal Hx of Anesthesia complications.   Social:  Non-Smoker    Hematology/Oncology:  Hematology Normal   Oncology Normal     EENT/Dental:EENT/Dental Normal   Cardiovascular:   Hypertension DVT's tx with chronic coumadin. Has ariel filter in place.   To bridge with lovenox   Pulmonary:  Pulmonary Normal    Renal/:  Renal/ Normal     Hepatic/GI:   GERD    Musculoskeletal:  Musculoskeletal Normal    Neurological:   Hypersensitivity on L arm following ulnar n surgery   Endocrine:   Diabetes, type 2    Dermatological:  Skin Normal    Psych:  Psychiatric Normal           Physical Exam  General:  Well nourished    Airway/Jaw/Neck:  Airway Findings: Mouth Opening: Normal Mallampati: I  TM Distance: Normal, at least 6 cm  Jaw/Neck Findings:  Neck ROM: Extension Decreased, Mild      Dental:  Dental Findings: In tact        Mental Status:  Mental Status Findings:  Cooperative, Alert and Oriented         Anesthesia Plan  Type of Anesthesia, risks & benefits discussed:  Anesthesia Type:  general  Patient's Preference:   Intra-op Monitoring Plan: standard ASA monitors  Intra-op Monitoring Plan Comments:   Post Op Pain Control Plan:   Post Op Pain Control Plan Comments:   Induction:   IV  Beta Blocker:         Informed Consent: Patient understands risks and agrees with Anesthesia plan.  Questions answered. Anesthesia consent signed with patient.  ASA Score: 3     Day of Surgery Review of History & Physical:    H&P update referred to the surgeon.     Anesthesia Plan Notes: Labs ordered today     Consider avoiding L arm for BP cuff  due to hypersensitivity there            Ready For Surgery From Anesthesia Perspective.

## 2017-11-02 ENCOUNTER — ANESTHESIA (OUTPATIENT)
Dept: SURGERY | Facility: OTHER | Age: 52
End: 2017-11-02
Payer: OTHER MISCELLANEOUS

## 2017-11-02 ENCOUNTER — HOSPITAL ENCOUNTER (OUTPATIENT)
Facility: OTHER | Age: 52
Discharge: HOME | End: 2017-11-02
Attending: SPECIALIST | Admitting: SPECIALIST
Payer: OTHER MISCELLANEOUS

## 2017-11-02 VITALS
TEMPERATURE: 98 F | SYSTOLIC BLOOD PRESSURE: 136 MMHG | DIASTOLIC BLOOD PRESSURE: 82 MMHG | OXYGEN SATURATION: 100 % | HEIGHT: 69 IN | HEART RATE: 86 BPM | RESPIRATION RATE: 18 BRPM | WEIGHT: 225 LBS | BODY MASS INDEX: 33.33 KG/M2

## 2017-11-02 DIAGNOSIS — K43.9 HERNIA, VENTRAL: ICD-10-CM

## 2017-11-02 DIAGNOSIS — Q82.8 ACCESSORY SKIN TAGS: Primary | ICD-10-CM

## 2017-11-02 LAB
INR PPP: 1
POCT GLUCOSE: 99 MG/DL (ref 70–110)
PROTHROMBIN TIME: 10.9 SEC

## 2017-11-02 PROCEDURE — C1781 MESH (IMPLANTABLE): HCPCS | Performed by: SPECIALIST

## 2017-11-02 PROCEDURE — 37000008 HC ANESTHESIA 1ST 15 MINUTES: Performed by: SPECIALIST

## 2017-11-02 PROCEDURE — 71000039 HC RECOVERY, EACH ADD'L HOUR: Performed by: SPECIALIST

## 2017-11-02 PROCEDURE — 88305 TISSUE EXAM BY PATHOLOGIST: CPT | Mod: 26,,, | Performed by: PATHOLOGY

## 2017-11-02 PROCEDURE — 37000009 HC ANESTHESIA EA ADD 15 MINS: Performed by: SPECIALIST

## 2017-11-02 PROCEDURE — 25000003 PHARM REV CODE 250: Performed by: SPECIALIST

## 2017-11-02 PROCEDURE — 36000707: Performed by: SPECIALIST

## 2017-11-02 PROCEDURE — 82962 GLUCOSE BLOOD TEST: CPT | Performed by: SPECIALIST

## 2017-11-02 PROCEDURE — 71000033 HC RECOVERY, INTIAL HOUR: Performed by: SPECIALIST

## 2017-11-02 PROCEDURE — 25000003 PHARM REV CODE 250: Performed by: ANESTHESIOLOGY

## 2017-11-02 PROCEDURE — C9290 INJ, BUPIVACAINE LIPOSOME: HCPCS | Performed by: SPECIALIST

## 2017-11-02 PROCEDURE — 71000015 HC POSTOP RECOV 1ST HR: Performed by: SPECIALIST

## 2017-11-02 PROCEDURE — 63600175 PHARM REV CODE 636 W HCPCS: Performed by: SPECIALIST

## 2017-11-02 PROCEDURE — 36000706: Performed by: SPECIALIST

## 2017-11-02 PROCEDURE — 71000016 HC POSTOP RECOV ADDL HR: Performed by: SPECIALIST

## 2017-11-02 PROCEDURE — 88305 TISSUE EXAM BY PATHOLOGIST: CPT | Performed by: PATHOLOGY

## 2017-11-02 PROCEDURE — 85610 PROTHROMBIN TIME: CPT

## 2017-11-02 PROCEDURE — 63600175 PHARM REV CODE 636 W HCPCS: Performed by: NURSE ANESTHETIST, CERTIFIED REGISTERED

## 2017-11-02 PROCEDURE — 25000003 PHARM REV CODE 250: Performed by: NURSE ANESTHETIST, CERTIFIED REGISTERED

## 2017-11-02 DEVICE — MESH HERNIA PHASIX 4X6IN RND: Type: IMPLANTABLE DEVICE | Site: ABDOMEN | Status: FUNCTIONAL

## 2017-11-02 RX ORDER — ONDANSETRON 2 MG/ML
4 INJECTION INTRAMUSCULAR; INTRAVENOUS DAILY PRN
Status: DISCONTINUED | OUTPATIENT
Start: 2017-11-02 | End: 2017-11-02 | Stop reason: HOSPADM

## 2017-11-02 RX ORDER — SODIUM CHLORIDE 0.9 % (FLUSH) 0.9 %
3 SYRINGE (ML) INJECTION EVERY 8 HOURS
Status: DISCONTINUED | OUTPATIENT
Start: 2017-11-02 | End: 2017-11-02 | Stop reason: HOSPADM

## 2017-11-02 RX ORDER — DIPHENHYDRAMINE HYDROCHLORIDE 50 MG/ML
25 INJECTION INTRAMUSCULAR; INTRAVENOUS EVERY 4 HOURS PRN
Status: DISCONTINUED | OUTPATIENT
Start: 2017-11-02 | End: 2017-11-02 | Stop reason: HOSPADM

## 2017-11-02 RX ORDER — CEFAZOLIN SODIUM 2 G/50ML
2 SOLUTION INTRAVENOUS
Status: DISCONTINUED | OUTPATIENT
Start: 2017-11-02 | End: 2017-11-02 | Stop reason: HOSPADM

## 2017-11-02 RX ORDER — FAMOTIDINE 20 MG/1
20 TABLET, FILM COATED ORAL
Status: COMPLETED | OUTPATIENT
Start: 2017-11-02 | End: 2017-11-02

## 2017-11-02 RX ORDER — PROPOFOL 10 MG/ML
VIAL (ML) INTRAVENOUS
Status: DISCONTINUED | OUTPATIENT
Start: 2017-11-02 | End: 2017-11-02

## 2017-11-02 RX ORDER — DIPHENHYDRAMINE HYDROCHLORIDE 50 MG/ML
25 INJECTION INTRAMUSCULAR; INTRAVENOUS EVERY 6 HOURS PRN
Status: DISCONTINUED | OUTPATIENT
Start: 2017-11-02 | End: 2017-11-02 | Stop reason: HOSPADM

## 2017-11-02 RX ORDER — ONDANSETRON 2 MG/ML
4 INJECTION INTRAMUSCULAR; INTRAVENOUS EVERY 12 HOURS PRN
Status: DISCONTINUED | OUTPATIENT
Start: 2017-11-02 | End: 2017-11-02 | Stop reason: HOSPADM

## 2017-11-02 RX ORDER — MIDAZOLAM HYDROCHLORIDE 1 MG/ML
INJECTION INTRAMUSCULAR; INTRAVENOUS
Status: DISCONTINUED | OUTPATIENT
Start: 2017-11-02 | End: 2017-11-02

## 2017-11-02 RX ORDER — OXYCODONE HYDROCHLORIDE 5 MG/1
15 TABLET ORAL EVERY 4 HOURS PRN
Status: DISCONTINUED | OUTPATIENT
Start: 2017-11-02 | End: 2017-11-02 | Stop reason: HOSPADM

## 2017-11-02 RX ORDER — GLYCOPYRROLATE 0.2 MG/ML
INJECTION INTRAMUSCULAR; INTRAVENOUS
Status: DISCONTINUED | OUTPATIENT
Start: 2017-11-02 | End: 2017-11-02

## 2017-11-02 RX ORDER — OXYCODONE AND ACETAMINOPHEN 7.5; 325 MG/1; MG/1
1 TABLET ORAL EVERY 4 HOURS PRN
Qty: 40 TABLET | Refills: 0 | Status: SHIPPED | OUTPATIENT
Start: 2017-11-02

## 2017-11-02 RX ORDER — LIDOCAINE HCL/PF 100 MG/5ML
SYRINGE (ML) INTRAVENOUS
Status: DISCONTINUED | OUTPATIENT
Start: 2017-11-02 | End: 2017-11-02

## 2017-11-02 RX ORDER — OXYCODONE HYDROCHLORIDE 5 MG/1
5 TABLET ORAL
Status: DISCONTINUED | OUTPATIENT
Start: 2017-11-02 | End: 2017-11-02 | Stop reason: HOSPADM

## 2017-11-02 RX ORDER — SODIUM CHLORIDE 0.9 % (FLUSH) 0.9 %
3 SYRINGE (ML) INJECTION
Status: DISCONTINUED | OUTPATIENT
Start: 2017-11-02 | End: 2017-11-02 | Stop reason: HOSPADM

## 2017-11-02 RX ORDER — FENTANYL CITRATE 50 UG/ML
25 INJECTION, SOLUTION INTRAMUSCULAR; INTRAVENOUS EVERY 5 MIN PRN
Status: COMPLETED | OUTPATIENT
Start: 2017-11-02 | End: 2017-11-02

## 2017-11-02 RX ORDER — RAMELTEON 8 MG/1
8 TABLET ORAL NIGHTLY PRN
Status: DISCONTINUED | OUTPATIENT
Start: 2017-11-02 | End: 2017-11-02 | Stop reason: HOSPADM

## 2017-11-02 RX ORDER — MEPERIDINE HYDROCHLORIDE 50 MG/ML
12.5 INJECTION INTRAMUSCULAR; INTRAVENOUS; SUBCUTANEOUS ONCE AS NEEDED
Status: DISCONTINUED | OUTPATIENT
Start: 2017-11-02 | End: 2017-11-02 | Stop reason: HOSPADM

## 2017-11-02 RX ORDER — FENTANYL CITRATE 50 UG/ML
INJECTION, SOLUTION INTRAMUSCULAR; INTRAVENOUS
Status: DISCONTINUED | OUTPATIENT
Start: 2017-11-02 | End: 2017-11-02

## 2017-11-02 RX ORDER — ONDANSETRON 8 MG/1
8 TABLET, ORALLY DISINTEGRATING ORAL EVERY 8 HOURS PRN
Status: DISCONTINUED | OUTPATIENT
Start: 2017-11-02 | End: 2017-11-02 | Stop reason: HOSPADM

## 2017-11-02 RX ORDER — TAMSULOSIN HYDROCHLORIDE 0.4 MG/1
0.4 CAPSULE ORAL DAILY
Status: DISCONTINUED | OUTPATIENT
Start: 2017-11-02 | End: 2017-11-02 | Stop reason: HOSPADM

## 2017-11-02 RX ORDER — BUPIVACAINE HYDROCHLORIDE AND EPINEPHRINE 2.5; 5 MG/ML; UG/ML
INJECTION, SOLUTION EPIDURAL; INFILTRATION; INTRACAUDAL; PERINEURAL
Status: DISCONTINUED | OUTPATIENT
Start: 2017-11-02 | End: 2017-11-02 | Stop reason: HOSPADM

## 2017-11-02 RX ORDER — ROCURONIUM BROMIDE 10 MG/ML
INJECTION, SOLUTION INTRAVENOUS
Status: DISCONTINUED | OUTPATIENT
Start: 2017-11-02 | End: 2017-11-02

## 2017-11-02 RX ORDER — NEOSTIGMINE METHYLSULFATE 1 MG/ML
INJECTION, SOLUTION INTRAVENOUS
Status: DISCONTINUED | OUTPATIENT
Start: 2017-11-02 | End: 2017-11-02

## 2017-11-02 RX ORDER — ONDANSETRON 2 MG/ML
4 INJECTION INTRAMUSCULAR; INTRAVENOUS EVERY 12 HOURS PRN
Status: DISCONTINUED | OUTPATIENT
Start: 2017-11-02 | End: 2017-11-02 | Stop reason: SDUPTHER

## 2017-11-02 RX ORDER — ONDANSETRON 2 MG/ML
INJECTION INTRAMUSCULAR; INTRAVENOUS
Status: DISCONTINUED | OUTPATIENT
Start: 2017-11-02 | End: 2017-11-02

## 2017-11-02 RX ORDER — ACETAMINOPHEN 10 MG/ML
INJECTION, SOLUTION INTRAVENOUS
Status: DISCONTINUED | OUTPATIENT
Start: 2017-11-02 | End: 2017-11-02

## 2017-11-02 RX ORDER — ACETAMINOPHEN 325 MG/1
650 TABLET ORAL EVERY 8 HOURS PRN
Status: DISCONTINUED | OUTPATIENT
Start: 2017-11-02 | End: 2017-11-02 | Stop reason: HOSPADM

## 2017-11-02 RX ORDER — HYDROMORPHONE HYDROCHLORIDE 2 MG/ML
0.4 INJECTION, SOLUTION INTRAMUSCULAR; INTRAVENOUS; SUBCUTANEOUS EVERY 5 MIN PRN
Status: DISCONTINUED | OUTPATIENT
Start: 2017-11-02 | End: 2017-11-02 | Stop reason: HOSPADM

## 2017-11-02 RX ORDER — SODIUM CHLORIDE, SODIUM LACTATE, POTASSIUM CHLORIDE, CALCIUM CHLORIDE 600; 310; 30; 20 MG/100ML; MG/100ML; MG/100ML; MG/100ML
INJECTION, SOLUTION INTRAVENOUS CONTINUOUS
Status: DISCONTINUED | OUTPATIENT
Start: 2017-11-02 | End: 2017-11-02 | Stop reason: HOSPADM

## 2017-11-02 RX ADMIN — HYDROMORPHONE HYDROCHLORIDE 0.4 MG: 2 INJECTION INTRAMUSCULAR; INTRAVENOUS; SUBCUTANEOUS at 10:11

## 2017-11-02 RX ADMIN — FENTANYL CITRATE 25 MCG: 50 INJECTION INTRAMUSCULAR; INTRAVENOUS at 09:11

## 2017-11-02 RX ADMIN — NEOSTIGMINE METHYLSULFATE 5 MG: 1 INJECTION INTRAVENOUS at 08:11

## 2017-11-02 RX ADMIN — OXYCODONE HYDROCHLORIDE 5 MG: 5 TABLET ORAL at 09:11

## 2017-11-02 RX ADMIN — HYDROMORPHONE HYDROCHLORIDE 0.4 MG: 2 INJECTION INTRAMUSCULAR; INTRAVENOUS; SUBCUTANEOUS at 09:11

## 2017-11-02 RX ADMIN — FENTANYL CITRATE 100 MCG: 50 INJECTION, SOLUTION INTRAMUSCULAR; INTRAVENOUS at 07:11

## 2017-11-02 RX ADMIN — SODIUM CHLORIDE, SODIUM LACTATE, POTASSIUM CHLORIDE, AND CALCIUM CHLORIDE: 600; 310; 30; 20 INJECTION, SOLUTION INTRAVENOUS at 07:11

## 2017-11-02 RX ADMIN — PROPOFOL 200 MG: 10 INJECTION, EMULSION INTRAVENOUS at 07:11

## 2017-11-02 RX ADMIN — EPHEDRINE SULFATE 10 MG: 50 INJECTION INTRAMUSCULAR; INTRAVENOUS; SUBCUTANEOUS at 08:11

## 2017-11-02 RX ADMIN — CEFAZOLIN SODIUM 2 G: 2 SOLUTION INTRAVENOUS at 07:11

## 2017-11-02 RX ADMIN — MIDAZOLAM HYDROCHLORIDE 2 MG: 1 INJECTION, SOLUTION INTRAMUSCULAR; INTRAVENOUS at 07:11

## 2017-11-02 RX ADMIN — HYDROMORPHONE HYDROCHLORIDE 0.4 MG: 2 INJECTION INTRAMUSCULAR; INTRAVENOUS; SUBCUTANEOUS at 12:11

## 2017-11-02 RX ADMIN — FAMOTIDINE 20 MG: 20 TABLET, FILM COATED ORAL at 06:11

## 2017-11-02 RX ADMIN — ACETAMINOPHEN 1000 MG: 10 INJECTION, SOLUTION INTRAVENOUS at 07:11

## 2017-11-02 RX ADMIN — TAMSULOSIN HYDROCHLORIDE 0.4 MG: 0.4 CAPSULE ORAL at 03:11

## 2017-11-02 RX ADMIN — ONDANSETRON 4 MG: 2 INJECTION INTRAMUSCULAR; INTRAVENOUS at 08:11

## 2017-11-02 RX ADMIN — LIDOCAINE HYDROCHLORIDE 75 MG: 20 INJECTION, SOLUTION INTRAVENOUS at 07:11

## 2017-11-02 RX ADMIN — GLYCOPYRROLATE 0.8 MG: 0.2 INJECTION, SOLUTION INTRAMUSCULAR; INTRAVENOUS at 08:11

## 2017-11-02 RX ADMIN — CARBOXYMETHYLCELLULOSE SODIUM 2 DROP: 2.5 SOLUTION/ DROPS OPHTHALMIC at 07:11

## 2017-11-02 RX ADMIN — ROCURONIUM BROMIDE 50 MG: 10 INJECTION, SOLUTION INTRAVENOUS at 07:11

## 2017-11-02 NOTE — ANESTHESIA POSTPROCEDURE EVALUATION
"Anesthesia Post Evaluation    Patient: Rangel Garcia    Procedure(s) Performed: Procedure(s) (LRB):  REPAIR-HERNIA-VENTRAL-INITIAL - with mesh (N/A)  INSERTION-IMPLANT (N/A)  EXCISION-LESION - SKIN TAG FROM SCROTUM (N/A)    Final Anesthesia Type: general  Patient location during evaluation: PACU  Patient participation: Yes- Able to Participate  Level of consciousness: awake and alert  Post-procedure vital signs: reviewed and stable  Pain management: adequate  Airway patency: patent  PONV status at discharge: No PONV  Anesthetic complications: no      Cardiovascular status: hemodynamically stable  Respiratory status: unassisted and spontaneous ventilation  Hydration status: euvolemic  Follow-up not needed.        Visit Vitals  /60   Pulse 72   Temp 36.6 °C (97.9 °F) (Oral)   Resp 20   Ht 5' 9" (1.753 m)   Wt 102.1 kg (225 lb)   SpO2 100%   BMI 33.23 kg/m²       Pain/Monet Score: Pain Assessment Performed: Yes (11/2/2017 10:45 AM)  Presence of Pain: complains of pain/discomfort (11/2/2017 10:45 AM)  Pain Rating Prior to Med Admin: 5 (11/2/2017 10:47 AM)  Pain Rating Post Med Admin: 4 (11/2/2017 11:02 AM)  Monet Score: 9 (11/2/2017 10:45 AM)      "

## 2017-11-02 NOTE — OP NOTE
[facility]  Brief Operative Note     SUMMARY     Surgery Date: 11/2/2017     Surgeon(s) and Role:     * Nathan Choudhury Jr., MD - Primary    Assisting Surgeon: None    Pre-op Diagnosis:  Ventral hernia without obstruction or gangrene [K43.9]    Post-op Diagnosis:  Post-Op Diagnosis Codes:     * Ventral hernia without obstruction or gangrene [K43.9]    Procedure(s) (LRB):  REPAIR-HERNIA-VENTRAL-INITIAL - with mesh (N/A)  INSERTION-IMPLANT (N/A)  EXCISION-LESION - SKIN TAG FROM SCROTUM (N/A)    Anesthesia: General    Description of the findings of the procedure: mid line small facial defect, scrotal tag    Findings/Key Components: above    Estimated Blood Loss: min         Specimens:   Specimen (12h ago through future)    Start     Ordered    11/02/17 0904  Specimen to Pathology - Surgery  Once     Comments:  1) Skin Tag, Scrotum      11/02/17 0905          Discharge Note    SUMMARY     Admit Date: 11/2/2017    Discharge Date and Time:  11/02/2017 9:26 AM    Hospital Course (synopsis of major diagnoses, care, treatment, and services provided during the course of the hospital stay): b9     Final Diagnosis: Post-Op Diagnosis Codes:     * Ventral hernia without obstruction or gangrene [K43.9]    Disposition: Home or Self Care    Follow Up/Patient Instructions:     Medications:  Reconciled Home Medications:   Current Discharge Medication List      START taking these medications    Details   oxyCODONE-acetaminophen (PERCOCET) 7.5-325 mg per tablet Take 1 tablet by mouth every 4 (four) hours as needed for Pain.  Qty: 40 tablet, Refills: 0         CONTINUE these medications which have NOT CHANGED    Details   acyclovir (ZOVIRAX) 200 MG capsule Refills: 0      amlodipine (NORVASC) 5 MG tablet Take 5 mg by mouth once daily.      DEPO-TESTOSTERONE 200 mg/mL injection INJECT 1 ML EVERY OTHER WEEK  Refills: 0      dexamethasone (DECADRON) 0.75 MG Tab TK 2 TS PO BID UTD  Refills: 0      gabapentin (NEURONTIN) 300  MG capsule Take 300 mg by mouth 2 (two) times daily.       metformin (GLUCOPHAGE) 500 MG tablet Take 500 mg by mouth once daily.  Refills: 6      methocarbamol (ROBAXIN) 750 MG Tab Take 500 mg by mouth 4 (four) times daily.      alprazolam (XANAX) 1 MG tablet Take 1 mg by mouth nightly.  Refills: 3      PENNSAID 20 mg/gram /actuation(2 %) sopm APPLY TWO PUMPS TO THE AFFECTED KNEE TWICE DAILY.  Refills: 3         STOP taking these medications       tramadol (ULTRAM) 50 mg tablet Comments:   Reason for Stopping:         valsartan-hydrochlorothiazide (DIOVAN-HCT) 320-12.5 mg per tablet Comments:   Reason for Stopping:         enoxaparin (LOVENOX) 150 mg/mL Syrg Comments:   Reason for Stopping:         warfarin (COUMADIN) 2.5 MG tablet Comments:   Reason for Stopping:         warfarin (COUMADIN) 5 MG tablet Comments:   Reason for Stopping:               Discharge Procedure Orders  Diet general     Activity as tolerated     Call MD for:  persistent nausea and vomiting     Call MD for:  severe uncontrolled pain     Call MD for:  redness, tenderness, or signs of infection (pain, swelling, redness, odor or green/yellow discharge around incision site)     Sponge bath only until clinic visit     Lifting restrictions   Scheduling Instructions: 20 Lbs     Leave dressing on - Keep it clean, dry, and intact until clinic visit   Order Comments: Leave steri strips intact.       Follow-up Information     Follow up In 1 week.

## 2017-11-02 NOTE — OP NOTE
DATE OF PROCEDURE:  11/02/2017    PREPROCEDURE DIAGNOSES:  Recurrent ventral hernia.  Skin tag, scrotum.    POSTOPERATIVE DIAGNOSES:  Recurrent ventral hernia.  Skin tag, scrotum.    PROCEDURE:  Open mesh repair of ventral hernia, excision of skin tag of the   scrotum.    SURGEON:  Nathan Choudhury Jr., M.D.    ASSISTANT:  No physician assistant.    PROCEDURE  IN DETAIL:  The patient was brought to the Operating Room, placed in   supine position.  The abdomen was prepped and draped in a sterile fashion.  The   patient had areas of fascial irregularity above the umbilicus in the old midline   incision, which were tender.  There was also a mass effect associated with   stranding.  The old midline incision was opened along this area, carried down   through the subcutaneous tissues where the fascia was encountered.  There were   defects present in the midline where the repair had been undertaken.  These were   small defects running along a distance of about 8 to 10 cm.  These were   repaired with interrupted 0 Ethibond sutures.  A piece of Phasix mesh was then   cut to an appropriate size and configuration and used to construct an onlay.    Skin and subcutaneous flaps were developed on the right and left side with the   incision extending for a distance of approximately 8 to 10 cm in all directions   above the fascial defect.  The Phasix was then sutured as an on lay with 2-0   Vicryl sutures, placed in an interrupted fashion.  The wounds were irrigated.  A   15 Steve drain placed in the subQ under the flaps.  The flaps were then closed   with interrupted 2-0 Vicryl, the subQ with interrupted 3-0 Vicryl, the skin with   stapling device.  The drain was secured with 2-0 silk.  The wound was sterilely   dressed.  The scrotum was then prepped and draped in a sterile fashion.  A   Metzenbaum scissors used to excise what appeared to be a skin tag on the   scrotum.  This was sent for permanent section.  Hemostasis was obtained with  4-0   Monocryl suture.  The patient tolerated the procedure well and left the   Operating Room in good condition.  At the end of procedure, all sponge, lap and   instrument counts were correct.      KATHRYN/IN  dd: 11/02/2017 09:30:07 (CDT)  td: 11/02/2017 11:06:08 (CDT)  Doc ID   #[id number]  Job ID #[id number]    CC:
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
## 2017-11-02 NOTE — INTERVAL H&P NOTE
The patient has been examined and the H&P has been reviewed:    I concur with the findings and no changes have occurred since H&P was written.              Active Hospital Problems    Diagnosis  POA    Hernia, ventral [K43.9]  Yes      Resolved Hospital Problems    Diagnosis Date Resolved POA   No resolved problems to display.

## 2017-11-02 NOTE — DISCHARGE INSTRUCTIONS
NO HEAVY LIFTING - NOTHING OVER 20 LBS      Discharge Instructions: Caring for Your Abraham-Soares Drainage Tube  Your doctor discharges you with a Abraham-Soares drainage tube. Doctors commonly leave this drain within the abdominal cavity after surgery. It helps drain and collect blood and body fluid after surgery. This can prevent swelling and reduces the risk for infection. The tube is held in place by a few stitches. It is covered with a bandage. Your doctor will remove the drain when he or she determines you no longer need it.    Home care  · Dont sleep on the same side as the tube.  · Secure the tube and bag inside your clothing with a safety pin. This helps keep the tube from being pulled out.  · Empty your drain at least twice a day. Empty it more often if the drain is full. Wash  and dry your hands before emptying the drain.  ¨ Lift the opening on the drain.  ¨ Drain the fluid into a measuring cup.  ¨ Record the amount of fluid each time you empty the drain. Include the date and time it was emptied. Share this information with your doctor on your next visit.  ¨ Squeeze the bulb with your hands until you hear air coming out of the bulb if your doctor has instructed you to do so (sometimes the bulb is used as a reservoir without suction). Check with your doctor about specific drain instructions.  ¨ Close the opening.  · Change the dressing around the tube every day.  ¨ Wash your hands.  ¨ Remove the old bandage.  ¨ Wash your hands again.  ¨ Wet a cotton swab and clean the skin around the incision and tube site. Use normal saline solution (salt and water). Or, you can use warm, soapy water.  ¨ Put a new bandage on the incision and tube site. Make the bandage large enough to cover the whole incision area.  ¨ Tape the bandage in place.  · Keep the bandage and tube site dry when you shower. Ask your healthcare provider about the best way to do this.  · Stripping the tube helps keep blood clots from blocking the  tube. Ask your nurse how often you should strip the tube. Stripping may not be needed, depending on where and why your doctor placed the tube. It may even be dangerous in some cases.   ¨ Hold the tubing where it leaves the skin, with one hand. This keeps it from pulling on the skin.  ¨ Pinch the tubing with the thumb and first finger of your other hand.  ¨ Slowly and firmly pull your thumb and first finger down the tubing. You may find it helpful to hold an alcohol swab between your fingers and the tube to lubricate the tubing.  ¨ If the pulling hurts or feels like its coming out of the skin, stop. Begin again more gently.    Follow-up care  Make a follow-up appointment as directed by our staff.     When to seek medical care  Call your healthcare provider right away if you have any of the following:    · New or increased pain around the tube  · Redness, swelling, or warmth around the incision or tube  · Drainage that is foul-smelling  · Vomiting  · Fever of 100.4°F (38°C)  · Fluid leaking around the tube  · Incision seems not to be healing  · Stitches become loose  · Tube falls out or breaks  · Drainage that changes from light pink to dark red  · Blood clots in the drainage bulb  · A sudden increase or decrease in the amount of drainage (over 30 mL)           Your Surgeon Gave You EXPAREL® (bupivacaine liposome injectable suspension)    To help control your pain after surgery, your surgeon injected EXPAREL into your surgical incision just before the end of the procedure.   EXPAREL is a local analgesic that contains the local anesthetic bupivacaine. Local anesthetics provide pain relief by numbing the tissue  around the surgical site.   EXPAREL is specifically designed to release pain medication over time and can control pain for up to 72 hours.   In addition to EXPAREL, your surgeon may provide other pain medications to control your pain.   Each patient is different and responds differently to painmedication.  Depending on how you respond to EXPAREL, you may require less  additional pain medication during your recovery.    Possible Side Effects    Side effects can occur with any medication and it is important not to ignore anything you may be experiencing. Some patients who  received EXPAREL experienced nausea, vomiting, or constipation. Rarely, patients who receive bupivacaine (the active ingredient in  EXPAREL) have experienced numbness and tingling in their mouth or lips, lightheadedness, or anxiety. Speak with your doctor right  away if you think you may be experiencing any of these sensations, or if you have other questions regarding possible side effects.    Your Recovery    When your pain is under control, your body can better focus on healing. This is not the time to test your pain tolerance, or grin and  bear it.Work with your surgeon and nurse to make your recovery as speedy and pain-free as possible.   Follow the post-op orders your nurse gave you.   Eat a healthy diet and drink plenty of water. Surgery stresses your body; your body responds by needing more energy to heal.      Important Information About EXPAREL  Products that contain bupivacaine, like EXPAREL, may cause a temporary loss of  sensation or the ability to move in the area where bupivacaine was injected.    Date Administered: 11/2/2017  Time Administered: 8:45 AM    Other Forms of Bupivicaine should not be administered within 96hrs following administration of EXPAREL.           Discharge Instructions for Hernia Repair    Activity after surgery  Recommendations include the following:  · After surgery, take it easy for the rest of the day. If you had general anesthesia, dont use machinery or power tools, drink alcohol, or make any major decisions for at least the first 24 hours.  · Dont drive while you are still taking opioid pain medicine, and dont drive until you are able to step firmly on the brake pedal without hesitation.  · Ask others to  help with chores and errands while you recover.  · Dont lift anything heavier than 20 pounds until your healthcare provider says its OK.  · Dont mow the lawn, use a vacuum , or do other strenuous activities until your healthcare provider says its OK.  · Walk as often as you feel able.  · Continue the coughing and deep breathing exercises that you learned in the hospital.  · Ask your healthcare provider when you can expect to return to work.  · Avoid constipation:  ¨ Eat fruits, vegetables, and whole grains.  ¨ Drink 6 to 8 glasses of water a day, unless otherwise instructed.  ¨ Use a laxative or a mild stool softener as instructed by your healthcare provider.    Follow-up care  Keep follow-up appointments during your recovery. These allow your healthcare provider to check your progress and make sure youre healing well. You may also need to have your stitches, staples, or bandage removed. During office visits, tell your healthcare provider if you have any new symptoms. And be sure to ask any questions you have.     When to call your healthcare provider  Call your healthcare provider immediately if you have any of the following:    · A large amount of swelling or bruising (some testicular swelling and bruising is common)  · Bleeding  · Increasing pain  · Increased redness or drainage of the incision  · Fever of 100.4°F (38.0°C) or higher, or as directed by your healthcare provider  · Trouble urinating  · Nausea or vomiting           Discharge Instructions: After Your Surgery  Youve just had surgery. During surgery, you were given medicine called anesthesia to keep you relaxed and free of pain. After surgery, you may have some pain or nausea. This is common. Here are some tips for feeling better and getting well after surgery.     Stay on schedule with your medicine.     Going home  Your healthcare provider will show you how to take care of yourself when you go home. He or she will also answer your  questions. Have an adult family member or friend drive you home. For the first 24 hours after your surgery:    · Do not drive or use heavy equipment.  · Do not make important decisions or sign legal papers.  · Do not drink alcohol.  · Have someone stay with you, if needed. He or she can watch for problems and help keep you safe.    Be sure to go to all follow-up visits with your healthcare provider. And rest after your surgery for as long as your healthcare provider tells you to.    Coping with pain  If you have pain after surgery, pain medicine will help you feel better. Take it as told, before pain becomes severe. Also, ask your healthcare provider or pharmacist about other ways to control pain. This might be with heat, ice, or relaxation. And follow any other instructions your surgeon or nurse gives you.    Tips for taking pain medicine  To get the best relief possible, remember these points:    · Pain medicines can upset your stomach. Taking them with a little food may help.  · Most pain relievers taken by mouth need at least 20 to 30 minutes to start to work.  · Taking medicine on a schedule can help you remember to take it. Try to time your medicine so that you can take it before starting an activity. This might be before you get dressed, go for a walk, or sit down for dinner.  · Constipation is a common side effect of pain medicines. Call your healthcare provider before taking any medicines such as laxatives or stool softeners to help ease constipation. Also ask if you should skip any foods. Drinking lots of fluids and eating foods such as fruits and vegetables that are high in fiber can also help. Remember, do not take laxatives unless your surgeon has prescribed them.  · Drinking alcohol and taking pain medicine can cause dizziness and slow your breathing. It can even be deadly. Do not drink alcohol while taking pain medicine.  · Pain medicine can make you react more slowly to things. Do not drive or run  machinery while taking pain medicine.    Your healthcare provider may tell you to take acetaminophen to help ease your pain. Ask him or her how much you are supposed to take each day. Acetaminophen or other pain relievers may interact with your prescription medicines or other over-the-counter (OTC) medicines. Some prescription medicines have acetaminophen and other ingredients. Using both prescription and OTC acetaminophen for pain can cause you to overdose. Read the labels on your OTC medicines with care. This will help you to clearly know the list of ingredients, how much to take, and any warnings. It may also help you not take too much acetaminophen. If you have questions or do not understand the information, ask your pharmacist or healthcare provider to explain it to you before you take the OTC medicine.    Managing nausea  Some people have an upset stomach after surgery. This is often because of anesthesia, pain, or pain medicine, or the stress of surgery. These tips will help you handle nausea and eat healthy foods as you get better. If you were on a special food plan before surgery, ask your healthcare provider if you should follow it while you get better. These tips may help:    · Do not push yourself to eat. Your body will tell you when to eat and how much.  · Start off with clear liquids and soup. They are easier to digest.  · Next try semi-solid foods, such as mashed potatoes, applesauce, and gelatin, as you feel ready.  · Slowly move to solid foods. Dont eat fatty, rich, or spicy foods at first.  · Do not force yourself to have 3 large meals a day. Instead eat smaller amounts more often.  · Take pain medicines with a small amount of solid food, such as crackers or toast, to avoid nausea.     Call your surgeon if  · You still have pain an hour after taking medicine. The medicine may not be strong enough.  · You feel too sleepy, dizzy, or groggy. The medicine may be too strong.  · You have side effects  like nausea, vomiting, or skin changes, such as rash, itching, or hives.       If you have obstructive sleep apnea  You were given anesthesia medicine during surgery to keep you comfortable and free of pain. After surgery, you may have more apnea spells because of this medicine and other medicines you were given. The spells may last longer than usual.   At home:    · Keep using the continuous positive airway pressure (CPAP) device when you sleep. Unless your healthcare provider tells you not to, use it when you sleep, day or night. CPAP is a common device used to treat obstructive sleep apnea.  · Talk with your provider before taking any pain medicine, muscle relaxants, or sedatives. Your provider will tell you about the possible dangers of taking these medicines.    © 0871-6175 [employer], [employer]. [address] [place] [address], [place] [address]. All rights reserved. This information is not intended as a substitute for professional medical care. Always follow your healthcare professional's instructions.    PLEASE FOLLOW ANY OTHER INSTRUCTIONS PROVIDED TO YOU BY DR. MANN!

## 2017-11-03 ENCOUNTER — NURSE TRIAGE (OUTPATIENT)
Dept: ADMINISTRATIVE | Facility: CLINIC | Age: 52
End: 2017-11-03

## 2017-11-03 NOTE — TELEPHONE ENCOUNTER
"  Reason for Disposition   [1] Caller requesting NON-URGENT health information AND [2] PCP's office is the best resource    Answer Assessment - Initial Assessment Questions  1. REASON FOR CALL or QUESTION: "What is your reason for calling today?" or "How can I best help you?" or "What question do you have that I can help answer?"      Patient is requesting a larger urine collection bag for urinary catheter.    Protocols used: ST INFORMATION ONLY CALL-A-AH    "

## 2017-11-04 NOTE — TELEPHONE ENCOUNTER
"Post op 11/2    Reason for Disposition   Over-The-Counter (OTC) medicines for mild constipation (all triage questions negative)    Answer Assessment - Initial Assessment Questions  1. SYMPTOM: "What's the main symptom you're concerned about?" (e.g., pain, fever, vomiting)     surg yest. Pt called re constipation. Last BM morning of 11/1. Ate chicken salad yest. Ate fiber cereal this am. + gassy/flatus. Taking stool softener. Had hemorrhoid rem'd the day before surg. No nausea. Walking around house. Drinking fluids. Taking pain meds.   2. ONSET: "When did ________  start?"   no BM since 11/1   3. SURGERY: "What surgery was performed?"     11/2   4. DATE of SURGERY: "When was surgery performed?"       Hernia surg   5. ANESTHESIA: " What type of anesthesia did you have?" (e.g., general, spinal, epidural, local)     N/a   6. PAIN: "Is there any pain?" If so, ask: "How bad is it?"  (Scale 1-10; or mild, moderate, severe)     Discomfort in stomach - wearing girdle after surg. + distension.    Protocols used: ST CONSTIPATION-A-AH, ST POST-OP SYMPTOMS AND [id number]  rec capful of miralax in warm prune juice. Call back with questions.     "

## 2017-12-07 ENCOUNTER — LAB VISIT (OUTPATIENT)
Dept: LAB | Facility: HOSPITAL | Age: 52
End: 2017-12-07
Attending: PODIATRIST
Payer: COMMERCIAL

## 2017-12-07 ENCOUNTER — ANESTHESIA EVENT (OUTPATIENT)
Dept: SURGERY | Facility: AMBULARY SURGERY CENTER | Age: 52
End: 2017-12-07
Payer: COMMERCIAL

## 2017-12-07 DIAGNOSIS — M20.21 HALLUX RIGIDUS OF RIGHT FOOT: ICD-10-CM

## 2017-12-07 DIAGNOSIS — Z79.01 ANTICOAGULATED ON COUMADIN: Primary | ICD-10-CM

## 2017-12-07 LAB
INR PPP: 1
PROTHROMBIN TIME: 10.8 SEC

## 2017-12-07 PROCEDURE — 85610 PROTHROMBIN TIME: CPT

## 2017-12-07 PROCEDURE — 36415 COLL VENOUS BLD VENIPUNCTURE: CPT

## 2017-12-08 ENCOUNTER — ANESTHESIA (OUTPATIENT)
Dept: SURGERY | Facility: AMBULARY SURGERY CENTER | Age: 52
End: 2017-12-08
Payer: COMMERCIAL

## 2017-12-08 PROBLEM — M20.20 HALLUX RIGIDUS, UNSPECIFIED FOOT: Status: ACTIVE | Noted: 2017-12-08

## 2017-12-08 NOTE — ANESTHESIA PREPROCEDURE EVALUATION
12/08/2017  Rangel Garcia is a 52 y.o., male.    Anesthesia Evaluation    I have reviewed the Patient Summary Reports.    I have reviewed the Nursing Notes.   I have reviewed the Medications.     Review of Systems  Social:  Non-Smoker    Cardiovascular:   Hypertension    Pulmonary:   Sleep Apnea    Renal/:   Chronic Renal Disease, CRI CKD (chronic kidney disease), stage II   Hepatic/GI:   GERD    Endocrine:   Diabetes, poorly controlled, type 2        Physical Exam  General:  Well nourished    Airway/Jaw/Neck:  Airway Findings: Mouth Opening: Normal Tongue: Normal  General Airway Assessment: Adult, Good  Mallampati: II  Improves to II with phonation.  TM Distance: 4-6 cm      Dental:  Dental Findings: In tact   Chest/Lungs:  Chest/Lungs Findings: Clear to auscultation, Normal Respiratory Rate     Heart/Vascular:  Heart Findings: Rate: Normal  Rhythm: Regular Rhythm  Sounds: Normal  Heart murmur: negative       Mental Status:  Mental Status Findings:  Cooperative, Alert and Oriented         Anesthesia Plan  Type of Anesthesia, risks & benefits discussed:  Anesthesia Type:  regional, MAC  Patient's Preference:   Intra-op Monitoring Plan: standard ASA monitors  Intra-op Monitoring Plan Comments:   Post Op Pain Control Plan:   Post Op Pain Control Plan Comments:   Induction:    Beta Blocker:  Patient is on a Beta-Blocker and has received one dose within the past 24 hours (No further documentation required).       Informed Consent: Patient understands risks and agrees with Anesthesia plan.  Questions answered. Anesthesia consent signed with patient.  ASA Score: 3     Day of Surgery Review of History & Physical: I have interviewed and examined the patient. I have reviewed the patient's H&P dated:  There are no significant changes.          Ready For Surgery From Anesthesia Perspective.

## 2017-12-11 ENCOUNTER — HOSPITAL ENCOUNTER (OUTPATIENT)
Facility: AMBULARY SURGERY CENTER | Age: 52
Discharge: HOME | End: 2017-12-11
Attending: PODIATRIST | Admitting: PODIATRIST
Payer: COMMERCIAL

## 2017-12-11 ENCOUNTER — SURGERY (OUTPATIENT)
Age: 52
End: 2017-12-11

## 2017-12-11 DIAGNOSIS — M20.40 HAMMER TOE: ICD-10-CM

## 2017-12-11 LAB — POCT GLUCOSE: 117 MG/DL (ref 70–110)

## 2017-12-11 PROCEDURE — 28285 REPAIR OF HAMMERTOE: CPT | Mod: LT | Performed by: PODIATRIST

## 2017-12-11 PROCEDURE — D9220A PRA ANESTHESIA: Mod: ANES,,, | Performed by: ANESTHESIOLOGY

## 2017-12-11 PROCEDURE — 28291 CORRJ HALUX RIGDUS W/IMPLT: CPT | Performed by: PODIATRIST

## 2017-12-11 PROCEDURE — D9220A PRA ANESTHESIA: Mod: CRNA,,, | Performed by: NURSE ANESTHETIST, CERTIFIED REGISTERED

## 2017-12-11 RX ORDER — MIDAZOLAM HYDROCHLORIDE 1 MG/ML
INJECTION, SOLUTION INTRAMUSCULAR; INTRAVENOUS
Status: DISCONTINUED | OUTPATIENT
Start: 2017-12-11 | End: 2017-12-11

## 2017-12-11 RX ORDER — OXYCODONE HCL 10 MG/1
10 TABLET, FILM COATED, EXTENDED RELEASE ORAL EVERY 12 HOURS PRN
Qty: 30 TABLET | Refills: 0 | Status: SHIPPED | OUTPATIENT
Start: 2017-12-11

## 2017-12-11 RX ORDER — DEXAMETHASONE SODIUM PHOSPHATE 4 MG/ML
INJECTION, SOLUTION INTRA-ARTICULAR; INTRALESIONAL; INTRAMUSCULAR; INTRAVENOUS; SOFT TISSUE
Status: DISCONTINUED | OUTPATIENT
Start: 2017-12-11 | End: 2017-12-11

## 2017-12-11 RX ORDER — PHENYLEPHRINE HYDROCHLORIDE 10 MG/ML
INJECTION INTRAVENOUS
Status: DISCONTINUED | OUTPATIENT
Start: 2017-12-11 | End: 2017-12-11

## 2017-12-11 RX ORDER — OXYCODONE HYDROCHLORIDE 5 MG/1
TABLET ORAL
Status: DISCONTINUED
Start: 2017-12-11 | End: 2017-12-11 | Stop reason: HOSPADM

## 2017-12-11 RX ORDER — SODIUM CHLORIDE, SODIUM LACTATE, POTASSIUM CHLORIDE, CALCIUM CHLORIDE 600; 310; 30; 20 MG/100ML; MG/100ML; MG/100ML; MG/100ML
INJECTION, SOLUTION INTRAVENOUS CONTINUOUS
Status: DISCONTINUED | OUTPATIENT
Start: 2017-12-11 | End: 2017-12-11 | Stop reason: HOSPADM

## 2017-12-11 RX ORDER — FENTANYL CITRATE 50 UG/ML
INJECTION, SOLUTION INTRAMUSCULAR; INTRAVENOUS
Status: DISCONTINUED | OUTPATIENT
Start: 2017-12-11 | End: 2017-12-11

## 2017-12-11 RX ORDER — PROPOFOL 10 MG/ML
INJECTION, EMULSION INTRAVENOUS
Status: DISCONTINUED
Start: 2017-12-11 | End: 2017-12-11 | Stop reason: HOSPADM

## 2017-12-11 RX ORDER — GLYCOPYRROLATE 0.2 MG/ML
INJECTION INTRAMUSCULAR; INTRAVENOUS
Status: DISCONTINUED
Start: 2017-12-11 | End: 2017-12-11 | Stop reason: HOSPADM

## 2017-12-11 RX ORDER — PROPOFOL 10 MG/ML
VIAL (ML) INTRAVENOUS
Status: DISCONTINUED | OUTPATIENT
Start: 2017-12-11 | End: 2017-12-11

## 2017-12-11 RX ORDER — ONDANSETRON 2 MG/ML
INJECTION INTRAMUSCULAR; INTRAVENOUS
Status: DISCONTINUED
Start: 2017-12-11 | End: 2017-12-11 | Stop reason: HOSPADM

## 2017-12-11 RX ORDER — OXYCODONE HYDROCHLORIDE 5 MG/1
TABLET ORAL
Status: COMPLETED
Start: 2017-12-11 | End: 2017-12-11

## 2017-12-11 RX ORDER — BUPIVACAINE HYDROCHLORIDE 5 MG/ML
INJECTION, SOLUTION EPIDURAL; INTRACAUDAL
Status: DISCONTINUED
Start: 2017-12-11 | End: 2017-12-11 | Stop reason: HOSPADM

## 2017-12-11 RX ORDER — FENTANYL CITRATE 50 UG/ML
INJECTION, SOLUTION INTRAMUSCULAR; INTRAVENOUS
Status: DISCONTINUED
Start: 2017-12-11 | End: 2017-12-11 | Stop reason: HOSPADM

## 2017-12-11 RX ORDER — BUPIVACAINE HYDROCHLORIDE 5 MG/ML
INJECTION, SOLUTION EPIDURAL; INTRACAUDAL
Status: DISCONTINUED | OUTPATIENT
Start: 2017-12-11 | End: 2017-12-11 | Stop reason: HOSPADM

## 2017-12-11 RX ORDER — CEFAZOLIN SODIUM 1 G/3ML
INJECTION, POWDER, FOR SOLUTION INTRAMUSCULAR; INTRAVENOUS
Status: DISCONTINUED | OUTPATIENT
Start: 2017-12-11 | End: 2017-12-11

## 2017-12-11 RX ORDER — HYDROMORPHONE HYDROCHLORIDE 2 MG/ML
0.2 INJECTION, SOLUTION INTRAMUSCULAR; INTRAVENOUS; SUBCUTANEOUS EVERY 5 MIN PRN
Status: DISCONTINUED | OUTPATIENT
Start: 2017-12-11 | End: 2017-12-11 | Stop reason: HOSPADM

## 2017-12-11 RX ORDER — DEXAMETHASONE SODIUM PHOSPHATE 4 MG/ML
INJECTION, SOLUTION INTRA-ARTICULAR; INTRALESIONAL; INTRAMUSCULAR; INTRAVENOUS; SOFT TISSUE
Status: DISCONTINUED
Start: 2017-12-11 | End: 2017-12-11 | Stop reason: HOSPADM

## 2017-12-11 RX ORDER — OXYCODONE HYDROCHLORIDE 5 MG/1
5 TABLET ORAL
Status: DISCONTINUED | OUTPATIENT
Start: 2017-12-11 | End: 2017-12-11 | Stop reason: HOSPADM

## 2017-12-11 RX ORDER — ONDANSETRON 2 MG/ML
4 INJECTION INTRAMUSCULAR; INTRAVENOUS ONCE
Status: DISCONTINUED | OUTPATIENT
Start: 2017-12-11 | End: 2017-12-11 | Stop reason: HOSPADM

## 2017-12-11 RX ORDER — CEFAZOLIN SODIUM 1 G/3ML
INJECTION, POWDER, FOR SOLUTION INTRAMUSCULAR; INTRAVENOUS
Status: DISCONTINUED
Start: 2017-12-11 | End: 2017-12-11 | Stop reason: HOSPADM

## 2017-12-11 RX ORDER — MEPERIDINE HYDROCHLORIDE 25 MG/ML
12.5 INJECTION INTRAMUSCULAR; INTRAVENOUS; SUBCUTANEOUS ONCE AS NEEDED
Status: DISCONTINUED | OUTPATIENT
Start: 2017-12-11 | End: 2017-12-11 | Stop reason: HOSPADM

## 2017-12-11 RX ORDER — OXYCODONE HYDROCHLORIDE 5 MG/1
5 TABLET ORAL ONCE AS NEEDED
Status: COMPLETED | OUTPATIENT
Start: 2017-12-12 | End: 2017-12-11

## 2017-12-11 RX ORDER — LIDOCAINE HYDROCHLORIDE 10 MG/ML
INJECTION, SOLUTION EPIDURAL; INFILTRATION; INTRACAUDAL; PERINEURAL
Status: DISCONTINUED | OUTPATIENT
Start: 2017-12-11 | End: 2017-12-11 | Stop reason: HOSPADM

## 2017-12-11 RX ORDER — SODIUM CHLORIDE, SODIUM LACTATE, POTASSIUM CHLORIDE, CALCIUM CHLORIDE 600; 310; 30; 20 MG/100ML; MG/100ML; MG/100ML; MG/100ML
75 INJECTION, SOLUTION INTRAVENOUS CONTINUOUS
Status: DISCONTINUED | OUTPATIENT
Start: 2017-12-11 | End: 2017-12-11 | Stop reason: HOSPADM

## 2017-12-11 RX ORDER — DIPHENHYDRAMINE HYDROCHLORIDE 50 MG/ML
25 INJECTION INTRAMUSCULAR; INTRAVENOUS EVERY 6 HOURS PRN
Status: DISCONTINUED | OUTPATIENT
Start: 2017-12-11 | End: 2017-12-11 | Stop reason: HOSPADM

## 2017-12-11 RX ORDER — SODIUM CHLORIDE 0.9 % (FLUSH) 0.9 %
3 SYRINGE (ML) INJECTION
Status: DISCONTINUED | OUTPATIENT
Start: 2017-12-11 | End: 2017-12-11 | Stop reason: HOSPADM

## 2017-12-11 RX ORDER — ONDANSETRON HYDROCHLORIDE 2 MG/ML
INJECTION, SOLUTION INTRAMUSCULAR; INTRAVENOUS
Status: DISCONTINUED | OUTPATIENT
Start: 2017-12-11 | End: 2017-12-11

## 2017-12-11 RX ORDER — LIDOCAINE HYDROCHLORIDE 10 MG/ML
1 INJECTION, SOLUTION EPIDURAL; INFILTRATION; INTRACAUDAL; PERINEURAL
Status: DISCONTINUED | OUTPATIENT
Start: 2017-12-11 | End: 2017-12-11 | Stop reason: HOSPADM

## 2017-12-11 RX ORDER — SODIUM CHLORIDE 0.9 % (FLUSH) 0.9 %
3 SYRINGE (ML) INJECTION EVERY 8 HOURS
Status: DISCONTINUED | OUTPATIENT
Start: 2017-12-11 | End: 2017-12-11 | Stop reason: HOSPADM

## 2017-12-11 RX ORDER — LIDOCAINE HCL/PF 100 MG/5ML
SYRINGE (ML) INTRAVENOUS
Status: DISCONTINUED | OUTPATIENT
Start: 2017-12-11 | End: 2017-12-11

## 2017-12-11 RX ORDER — FENTANYL CITRATE 50 UG/ML
25 INJECTION, SOLUTION INTRAMUSCULAR; INTRAVENOUS EVERY 5 MIN PRN
Status: DISCONTINUED | OUTPATIENT
Start: 2017-12-11 | End: 2017-12-11 | Stop reason: HOSPADM

## 2017-12-11 RX ORDER — MIDAZOLAM HYDROCHLORIDE 1 MG/ML
INJECTION INTRAMUSCULAR; INTRAVENOUS
Status: DISCONTINUED
Start: 2017-12-11 | End: 2017-12-11 | Stop reason: HOSPADM

## 2017-12-11 RX ORDER — DEXAMETHASONE SODIUM PHOSPHATE 4 MG/ML
INJECTION, SOLUTION INTRA-ARTICULAR; INTRALESIONAL; INTRAMUSCULAR; INTRAVENOUS; SOFT TISSUE
Status: DISCONTINUED | OUTPATIENT
Start: 2017-12-11 | End: 2017-12-11 | Stop reason: HOSPADM

## 2017-12-11 RX ORDER — LIDOCAINE HYDROCHLORIDE 20 MG/ML
INJECTION, SOLUTION EPIDURAL; INFILTRATION; INTRACAUDAL; PERINEURAL
Status: DISCONTINUED
Start: 2017-12-11 | End: 2017-12-11 | Stop reason: HOSPADM

## 2017-12-11 RX ORDER — LIDOCAINE HYDROCHLORIDE 10 MG/ML
INJECTION, SOLUTION EPIDURAL; INFILTRATION; INTRACAUDAL; PERINEURAL
Status: DISCONTINUED
Start: 2017-12-11 | End: 2017-12-11 | Stop reason: HOSPADM

## 2017-12-11 RX ADMIN — FENTANYL CITRATE 25 MCG: 50 INJECTION, SOLUTION INTRAMUSCULAR; INTRAVENOUS at 02:12

## 2017-12-11 RX ADMIN — LIDOCAINE HYDROCHLORIDE 20 ML: 10 INJECTION, SOLUTION EPIDURAL; INFILTRATION; INTRACAUDAL; PERINEURAL at 11:12

## 2017-12-11 RX ADMIN — SODIUM CHLORIDE, SODIUM LACTATE, POTASSIUM CHLORIDE, CALCIUM CHLORIDE: 600; 310; 30; 20 INJECTION, SOLUTION INTRAVENOUS at 11:12

## 2017-12-11 RX ADMIN — DEXAMETHASONE SODIUM PHOSPHATE 4 MG: 4 INJECTION, SOLUTION INTRA-ARTICULAR; INTRALESIONAL; INTRAMUSCULAR; INTRAVENOUS; SOFT TISSUE at 01:12

## 2017-12-11 RX ADMIN — BUPIVACAINE HYDROCHLORIDE 20 ML: 5 INJECTION, SOLUTION EPIDURAL; INTRACAUDAL at 11:12

## 2017-12-11 RX ADMIN — PHENYLEPHRINE HYDROCHLORIDE 100 MCG: 10 INJECTION INTRAVENOUS at 11:12

## 2017-12-11 RX ADMIN — DEXAMETHASONE SODIUM PHOSPHATE 8 MG: 4 INJECTION, SOLUTION INTRA-ARTICULAR; INTRALESIONAL; INTRAMUSCULAR; INTRAVENOUS; SOFT TISSUE at 10:12

## 2017-12-11 RX ADMIN — Medication 100 MG: at 10:12

## 2017-12-11 RX ADMIN — MIDAZOLAM HYDROCHLORIDE 2 MG: 1 INJECTION, SOLUTION INTRAMUSCULAR; INTRAVENOUS at 10:12

## 2017-12-11 RX ADMIN — SODIUM CHLORIDE, SODIUM LACTATE, POTASSIUM CHLORIDE, CALCIUM CHLORIDE: 600; 310; 30; 20 INJECTION, SOLUTION INTRAVENOUS at 10:12

## 2017-12-11 RX ADMIN — Medication 200 MG: at 10:12

## 2017-12-11 RX ADMIN — OXYCODONE HYDROCHLORIDE: 5 TABLET ORAL at 03:12

## 2017-12-11 RX ADMIN — FENTANYL CITRATE 50 MCG: 50 INJECTION, SOLUTION INTRAMUSCULAR; INTRAVENOUS at 10:12

## 2017-12-11 RX ADMIN — OXYCODONE HYDROCHLORIDE 5 MG: 5 TABLET ORAL at 02:12

## 2017-12-11 RX ADMIN — ONDANSETRON HYDROCHLORIDE 4 MG: 2 INJECTION, SOLUTION INTRAMUSCULAR; INTRAVENOUS at 10:12

## 2017-12-11 RX ADMIN — CEFAZOLIN SODIUM 1 G: 1 INJECTION, POWDER, FOR SOLUTION INTRAMUSCULAR; INTRAVENOUS at 10:12

## 2017-12-11 NOTE — PLAN OF CARE
Bilateral foot pain continues; instructions reviewed with pt/father; D/c'd per wheelchair; feet elevated and ice to ankles for ride home.

## 2017-12-11 NOTE — OR NURSING
1056 Dr. Mclean aware of patient's PT and INR from 12/07/17 and that patient stopped Lovenox on 12/09/17. Ok to proceed.

## 2017-12-11 NOTE — DISCHARGE INSTRUCTIONS
FOOT SURGERY  After surgery:    DOS:   Keep leg elevated until first post operative visit   Keep ice pack on the foot for 48-72 hours   Ice on for 20 minutes of each hour while awake   Keep dressing clean and dry DO NOT CHANGE BANDAGES   Advanced diet as tolerated.    Check circulation frequently in toes by pressing down on toenail. Nail should turn white and then pink when released.   May walk on foot to go to the bathroom only DO NOT WALK WITHOUT SHOE   Wear surgical shoe. May remove shoe to sleep.   Resume home medications_________________    DONT:   Do not remove your dressing   Do not get dressing wet.   No driving until released by MD   DO NOT TAKE ADDITIONAL TYLENOL/ACETAMINOPHEN WHILE TAKING NARCOTIC PAIN MEDICATION THAT CONTAINS TYLENOL/ACETAMINOPHEN.    CALL PHYSICIAN FOR:   Pain, burning, or numbness of the toes not relieved by elevation of the leg.   Pale or cold toes; bluish nail beds.   Redness, swelling, or bleeding.   Fever> 101   Drainage (pus) from the puncture sites   Pain unrelieved by pain medication  KEEP SCHEDULED APPOINTMENT    Dr Mclean Cell # [phone]  [employer] Voice mail    Leave a message and he will call you back!

## 2017-12-11 NOTE — TRANSFER OF CARE
"Anesthesia Transfer of Care Note    Patient: Rangel Garcia    Procedure(s) Performed: Procedure(s) (LRB):  REPAIR-HAMMER TOE (Bilateral)    Patient location: PACU    Anesthesia Type: general    Transport from OR: Transported from OR on room air with adequate spontaneous ventilation    Post pain: adequate analgesia    Post assessment: no apparent anesthetic complications and tolerated procedure well    Post vital signs: stable    Level of consciousness: sedated    Nausea/Vomiting: no nausea/vomiting    Complications: none    Transfer of care protocol was followed      Last vitals:   Visit Vitals  /65   Pulse 93   Temp 36.5 °C (97.7 °F) (Oral)   Resp 14   Ht 5' 9" (1.753 m)   Wt 99.8 kg (220 lb)   SpO2 99%   BMI 32.49 kg/m²     "

## 2017-12-11 NOTE — ANESTHESIA POSTPROCEDURE EVALUATION
"Anesthesia Post Evaluation    Patient: Rangel Garcia    Procedure(s) Performed: Procedure(s) (LRB):  REPAIR-HAMMER TOE (Bilateral)    Final Anesthesia Type: general  Patient location during evaluation: PACU  Patient participation: Yes- Able to Participate  Level of consciousness: awake and alert  Post-procedure vital signs: reviewed and stable  Pain management: adequate  Airway patency: patent  PONV status at discharge: No PONV  Anesthetic complications: no      Cardiovascular status: blood pressure returned to baseline and hemodynamically stable  Respiratory status: unassisted  Hydration status: euvolemic  Follow-up not needed.        Visit Vitals  /85 (BP Location: Right arm, Patient Position: Lying)   Pulse 85   Temp 36.4 °C (97.6 °F) (Oral)   Resp 18   Ht 5' 9" (1.753 m)   Wt 99.8 kg (220 lb)   SpO2 98%   BMI 32.49 kg/m²       Pain/Monet Score: Pain Assessment Performed: Yes (12/11/2017  9:43 AM)  Presence of Pain: denies (12/11/2017  9:43 AM)      "

## 2017-12-11 NOTE — ANESTHESIA PREPROCEDURE EVALUATION
12/11/2017  Rangel Garcia is a 52 y.o., male.    Anesthesia Evaluation    I have reviewed the Patient Summary Reports.    I have reviewed the Nursing Notes.      Review of Systems  Anesthesia Hx:  No problems with previous Anesthesia    Cardiovascular:   Hypertension, well controlled Hx of DVT's   Pulmonary:   Sleep Apnea, CPAP    Hepatic/GI:   GERD, well controlled        Physical Exam  General:  Obesity                 Anesthesia Plan  Type of Anesthesia, risks & benefits discussed:  Anesthesia Type:  MAC  Patient's Preference:   Intra-op Monitoring Plan:   Intra-op Monitoring Plan Comments:   Post Op Pain Control Plan:   Post Op Pain Control Plan Comments:   Induction:   IV  Beta Blocker:  Patient is not currently on a Beta-Blocker (No further documentation required).       Informed Consent: Patient understands risks and agrees with Anesthesia plan.  Questions answered. Anesthesia consent signed with patient.  ASA Score: 2     Day of Surgery Review of History & Physical:    H&P update referred to the surgeon.         Ready For Surgery From Anesthesia Perspective.

## 2017-12-11 NOTE — BRIEF OP NOTE
[facility]  Brief Operative Note     SUMMARY     Surgery Date: 12/11/2017     Surgeon(s) and Role:     * Deonte Mclean DPM - Primary    Assisting Surgeon: None    Pre-op Diagnosis:  Acquired hallux rigidus, unspecified laterality [M20.20]    Post-op Diagnosis:  Post-Op Diagnosis Codes:     * Acquired hallux rigidus, unspecified laterality [M20.20]    Procedure(s) (LRB):  REPAIR-HAMMER TOE (Bilateral)    Anesthesia: General    Description of the findings of the procedure: bilateral hammertoe correction - great toe    Findings/Key Components: bilateral great toe flexible implants    Estimated Blood Loss: * No values recorded between 12/11/2017 11:08 AM and 12/11/2017  1:23 PM *         Specimens:   Specimen (12h ago through future)    None          Discharge Note    SUMMARY     Admit Date: 12/11/2017    Discharge Date and Time:  12/11/2017 1:25 PM    Hospital Course (synopsis of major diagnoses, care, treatment, and services provided during the course of the hospital stay): outpt     Final Diagnosis: Post-Op Diagnosis Codes:     * Acquired hallux rigidus, unspecified laterality [M20.20]    Disposition: Home or Self Care    Follow Up/Patient Instructions:     Medications:  Reconciled Home Medications:   Current Discharge Medication List      START taking these medications    Details   oxyCODONE (OXYCONTIN) 10 mg 12 hr tablet Take 1 tablet (10 mg total) by mouth every 12 (twelve) hours as needed for Pain.  Qty: 30 tablet, Refills: 0         CONTINUE these medications which have NOT CHANGED    Details   alprazolam (XANAX) 1 MG tablet Take 1 mg by mouth nightly as needed.   Refills: 3      amlodipine (NORVASC) 5 MG tablet Take 5 mg by mouth once daily.      famotidine (PEPCID) 20 MG tablet Take 40 mg by mouth once daily.      acyclovir (ZOVIRAX) 200 MG capsule Refills: 0      cetirizine (ZYRTEC) 10 MG tablet Take 10 mg by mouth daily as needed for Allergies.      coenzyme Q10 (CO Q-10) 100 mg  capsule Take 300 mg by mouth once daily.      DEPO-TESTOSTERONE 200 mg/mL injection INJECT 1 ML EVERY OTHER WEEK  Refills: 0      dexamethasone (DECADRON) 0.75 MG Tab TK 2 TS PO BID UTD  Refills: 0      enoxaparin (LOVENOX) 40 mg/0.4 mL Syrg Inject 40 mg into the skin once daily.      flaxseed oil 1,000 mg Cap Take 1 capsule by mouth once daily.      fluticasone (FLONASE) 50 mcg/actuation nasal spray 1 spray by Each Nare route once daily.      gabapentin (NEURONTIN) 300 MG capsule Take 300 mg by mouth 2 (two) times daily.       ketotifen (ZADITOR) 0.025 % (0.035 %) ophthalmic solution Place 1 drop into both eyes 2 (two) times daily as needed.      krill-om3-dha-epa-om6-lip-astx (KRILL OIL, OMEGA 3 AND 6,) 1000-130(40-80) mg Cap Take by mouth.      loratadine (CLARITIN) 10 mg tablet Take 10 mg by mouth daily as needed for Allergies.      metformin (GLUCOPHAGE) 500 MG tablet Take 500 mg by mouth once daily.  Refills: 6      methocarbamol (ROBAXIN) 750 MG Tab Take 500 mg by mouth 4 (four) times daily as needed.       oxyCODONE-acetaminophen (PERCOCET) 7.5-325 mg per tablet Take 1 tablet by mouth every 4 (four) hours as needed for Pain.  Qty: 40 tablet, Refills: 0      PENNSAID 20 mg/gram /actuation(2 %) sopm APPLY TWO PUMPS TO THE AFFECTED KNEE TWICE DAILY.  Refills: 3      pitavastatin calcium (LIVALO) 2 mg Tab tablet Take 2 mg by mouth once daily.      TURM/GING/LLUVIA/YUC/GRISELDA/ALESSANDRA/HOR (TUMERSAID ORAL) Take 200 mg by mouth once daily.      valsartan-hydrochlorothiazide (DIOVAN-HCT) 320-25 mg per tablet Take 1 tablet by mouth once daily.      vitamin D (VITAMIN D3) 1000 units Tab Take 6,000 Units by mouth once daily.      !! warfarin (COUMADIN) 2.5 MG tablet Take 2.5 mg by mouth once daily. Saturday      !! warfarin (COUMADIN) 5 MG tablet Take 5 mg by mouth once daily. All days except Saturday.       !! - Potential duplicate medications found. Please discuss with provider.          Discharge Procedure Orders  Diet  general     Ice to affected area     Activity as tolerated     Keep surgical extremity elevated     Call MD for:  temperature >100.4     Call MD for:  persistent nausea and vomiting or diarrhea     Call MD for:  severe uncontrolled pain     Call MD for:  redness, tenderness, or signs of infection (pain, swelling, redness, odor or green/yellow discharge around incision site)     Leave dressing on - Keep it clean, dry, and intact until clinic visit       Follow-up Information     Deonte Mclean DPM.    Specialty:  Podiatry  Why:  Tuesday at [place] Office  Contact information:  [id number]   [facility] [address] [facility] [address]  [phone]

## 2017-12-12 VITALS
TEMPERATURE: 98 F | RESPIRATION RATE: 13 BRPM | HEART RATE: 88 BPM | OXYGEN SATURATION: 98 % | BODY MASS INDEX: 32.58 KG/M2 | HEIGHT: 69 IN | WEIGHT: 220 LBS | DIASTOLIC BLOOD PRESSURE: 78 MMHG | SYSTOLIC BLOOD PRESSURE: 129 MMHG

## 2017-12-28 NOTE — OP NOTE
DATE OF PROCEDURE:  12/11/2017.    PREOPERATIVE DIAGNOSIS:  Hallux rigidus, bilateral.    POSTOPERATIVE DIAGNOSIS:  Hallux rigidus, bilateral.    PROCEDURE PERFORMED:  Correction of hallux rigidus with cheilectomy and flexible   hinged toe implants.    PROCEDURE IN DETAIL:  This patient was taken to the Operating Room, placed on   the operating table in the supine position and pneumatic ankle tourniquets were   placed around both ankles.  Following IV sedation, local anesthesia was done   with a 1:1 mixture of 1% Xylocaine plain and 0.5% Marcaine plain.  An Esmarch   bandage was used to exsanguinate the right foot initially and subsequently the   left foot when we proceeded to the left foot procedure.  The procedure commenced   with an incision over the proximal area of the first metatarsophalangeal joint   extending from the IP joint to just below the metatarsophalangeal joint and   deepened down through the subcutaneous tissue with care being taken to retract   all vital, neural and vascular structures.  The incision was deepened down to   the level of the capsule, at which time the capsule was incised to expose the   head of the first metatarsal and base of the proximal phalanx of the great toe.    Once we have decent dissection and freeing up of the bone from its ligamentous   and soft tissue attachments.  The first MP joint was identified to have   hypertrophic bone on the dorsal and medial aspect of the joint.  That area was   cleaned up all the hypertrophic bone.   At this time, the cartilage of the   metatarsal was resected in total and passed from the operative site as well as   the cartilage from the base of the proximal phalanx and the toe was flexion to   90 degrees flexion and centered, the central area of the metatarsal was   identified to be perfectly down the canal of the metatarsal.  Once we were in   good position.  We proceeded with the trocar and the reamer to create the hole   for which the  stems of the implants were be placed, with the assistance of an   osteotome as well as the proper instrumentation with the Mercateo implant   system.  Once we were completely into the area where we need to be as far as   placement of the implant we used sizers to see which would be the best size, and   we settle on the size 3, great toe implant.  The implant was placed in the   joint and there was good range of motion dorsal as well as in the transverse   plane.  Once we were happy with the degree of motion.  We were able to achieve   and the placement of the implant.  The capsule was closed with 2-0 Vicryl suture   and subcutaneous Vicryl was used for the skin.  We continued to close the skin   with 5-0 Prolene in simple interrupted suture fashion.   Once we were completed   with the right side, we did the exact same procedure and exact same implant on   the left side with no complications to report.  The patient tolerated the   procedures and anesthesia well, left the Operating Room with vital signs stable   and both feet in good condition.  He will be seen in my office in 3 to 5 days   for postop followup and dressing change.      ANA/IN  dd: 12/27/2017 13:48:49 (CST)  td: 12/27/2017 21:27:52 (CST)  Doc ID   #[id number]  Job ID #[id number]    CC:

## 2018-04-06 ENCOUNTER — TELEPHONE (OUTPATIENT)
Dept: SURGERY | Facility: OTHER | Age: 53
End: 2018-04-06

## 2018-07-20 ENCOUNTER — HOSPITAL ENCOUNTER (OUTPATIENT)
Dept: RADIOLOGY | Facility: OTHER | Age: 53
Discharge: HOME | End: 2018-07-20
Attending: SPECIALIST
Payer: OTHER MISCELLANEOUS
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
## 2018-07-20 PROCEDURE — 93971 EXTREMITY STUDY: CPT | Mod: TC

## 2018-07-20 PROCEDURE — 93971 EXTREMITY STUDY: CPT | Mod: 26,,, | Performed by: RADIOLOGY

## 2019-03-20 ENCOUNTER — HOSPITAL ENCOUNTER (EMERGENCY)
Facility: OTHER | Age: 54
Discharge: HOME | End: 2019-03-21
Attending: EMERGENCY MEDICINE
Payer: OTHER MISCELLANEOUS

## 2019-03-20 DIAGNOSIS — T59.811A SMOKE INHALATION: Primary | ICD-10-CM

## 2019-03-20 PROCEDURE — 25000003 PHARM REV CODE 250: Performed by: EMERGENCY MEDICINE

## 2019-03-20 PROCEDURE — 96360 HYDRATION IV INFUSION INIT: CPT

## 2019-03-20 PROCEDURE — 99284 EMERGENCY DEPT VISIT MOD MDM: CPT

## 2019-03-20 RX ADMIN — SODIUM CHLORIDE 1000 ML: 0.9 INJECTION, SOLUTION INTRAVENOUS at 11:03

## 2019-03-21 VITALS
SYSTOLIC BLOOD PRESSURE: 148 MMHG | HEART RATE: 78 BPM | RESPIRATION RATE: 18 BRPM | TEMPERATURE: 99 F | HEIGHT: 69 IN | DIASTOLIC BLOOD PRESSURE: 80 MMHG | BODY MASS INDEX: 32.58 KG/M2 | WEIGHT: 220 LBS | OXYGEN SATURATION: 96 %
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
## 2019-03-21 NOTE — ED NOTES
Rounding on the patient has been done. Pt is AAOx 4, no acute distress noted, respirations even, unlabored, vital signs stable with monitoring in process, skin warm and dry. The patient has been updated on the plan of care and current status. Pain was assessed and at pain level 0/10 . Comfort positioning and restroom needs were addressed. He denies any needs.  Necessary items were placed within reach and was advised when a reassessment would take place. The call bell remains at the bedside for any additional patient needs. The patient is resting comfortably on the stretcher, bed locked, low position, side rails up x 2 with fellow officer at bedside.  Will continue to monitor.
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
## 2019-07-02 ENCOUNTER — TELEPHONE (OUTPATIENT)
Dept: SPORTS MEDICINE | Facility: CLINIC | Age: 54
End: 2019-07-02

## 2019-07-03 ENCOUNTER — OFFICE VISIT (OUTPATIENT)
Dept: URGENT CARE | Facility: CLINIC | Age: 54
End: 2019-07-03
Payer: OTHER MISCELLANEOUS

## 2019-07-03 VITALS
TEMPERATURE: 98 F | WEIGHT: 220 LBS | BODY MASS INDEX: 32.58 KG/M2 | RESPIRATION RATE: 18 BRPM | DIASTOLIC BLOOD PRESSURE: 84 MMHG | HEART RATE: 98 BPM | SYSTOLIC BLOOD PRESSURE: 121 MMHG | OXYGEN SATURATION: 97 % | HEIGHT: 69 IN

## 2019-07-03 DIAGNOSIS — M25.561 ACUTE PAIN OF RIGHT KNEE: ICD-10-CM

## 2019-07-03 DIAGNOSIS — S46.911A STRAIN OF RIGHT SHOULDER, INITIAL ENCOUNTER: ICD-10-CM
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
## 2019-07-03 DIAGNOSIS — S43.005A DISLOCATION OF LEFT SHOULDER JOINT, INITIAL ENCOUNTER: ICD-10-CM

## 2019-07-03 PROCEDURE — 99203 PR OFFICE/OUTPT VISIT, NEW, LEVL III, 30-44 MIN: ICD-10-PCS | Mod: S$GLB,,, | Performed by: NURSE PRACTITIONER

## 2019-07-03 PROCEDURE — 99203 OFFICE O/P NEW LOW 30 MIN: CPT | Mod: S$GLB,,, | Performed by: NURSE PRACTITIONER

## 2019-07-03 PROCEDURE — 73562 XR KNEE 3 VIEW RIGHT: ICD-10-PCS | Mod: RT,S$GLB,, | Performed by: RADIOLOGY

## 2019-07-03 PROCEDURE — 73562 X-RAY EXAM OF KNEE 3: CPT | Mod: RT,S$GLB,, | Performed by: RADIOLOGY

## 2019-07-03 PROCEDURE — 73030 X-RAY EXAM OF SHOULDER: CPT | Mod: 50,S$GLB,, | Performed by: RADIOLOGY

## 2019-07-03 PROCEDURE — 73030 XR SHOULDER TRAUMA 3 VIEW BILATERAL: ICD-10-PCS | Mod: 50,S$GLB,, | Performed by: RADIOLOGY

## 2019-07-03 RX ORDER — CYCLOBENZAPRINE HCL 10 MG
10 TABLET ORAL 3 TIMES DAILY PRN
Qty: 21 TABLET | Refills: 0 | Status: SHIPPED | OUTPATIENT
Start: 2019-07-03 | End: 2019-07-03 | Stop reason: SDUPTHER

## 2019-07-03 RX ORDER — CYCLOBENZAPRINE HCL 10 MG
10 TABLET ORAL 3 TIMES DAILY PRN
Qty: 21 TABLET | Refills: 0 | Status: SHIPPED | OUTPATIENT
Start: 2019-07-03 | End: 2019-07-10

## 2019-07-03 RX ORDER — DEXTROMETHORPHAN HYDROBROMIDE, GUAIFENESIN 5; 100 MG/5ML; MG/5ML
650 LIQUID ORAL EVERY 8 HOURS
Refills: 0 | COMMUNITY
Start: 2019-07-03

## 2019-07-03 NOTE — PROGRESS NOTES
Subjective:       Patient ID: Rangel Garcia is a 53 y.o. male.    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 States that he was helping someone down a ladder and she fell on him. She was about 250lbs.    Shoulder Injury    The incident occurred at work. Both shoulders are affected. The incident occurred more than 1 week ago. The injury mechanism was repetitive motion, overhead work and a twisting injury. The quality of the pain is described as aching, stabbing, burning and shooting. The pain radiates to the back, left neck and right neck. The pain is at a severity of 6/10. The pain is moderate. Associated symptoms include muscle weakness. Pertinent negatives include no chest pain, numbness or tingling. The symptoms are aggravated by overhead lifting, movement and palpation. He has tried NSAIDs, rest, heat and ice for the symptoms. The treatment provided mild relief.   Knee Injury   This is a new problem. The current episode started more than 1 month ago. The problem occurs constantly. The problem has been unchanged. Pertinent negatives include no abdominal pain, chest pain, chills, fever, headaches, nausea, neck pain, numbness, rash, sore throat or vomiting. The symptoms are aggravated by bending, walking and twisting.     Review of Systems   Constitution: Negative for chills and fever.   HENT: Negative for sore throat.    Eyes: Negative for blurred vision.   Cardiovascular: Negative for chest pain.   Respiratory: Negative for shortness of breath.    Skin: Negative for rash.   Musculoskeletal: Positive for back pain, joint pain, muscle weakness and stiffness. Negative for neck pain.   Gastrointestinal: Negative for abdominal pain, diarrhea, nausea and vomiting.   Neurological: Negative for headaches, numbness and tingling.    Psychiatric/Behavioral: The patient is not nervous/anxious.    All other systems reviewed and are negative.      Objective:      Physical Exam   Constitutional: He is oriented to person, place, and time. Vital signs are normal. He appears well-developed and well-nourished. He is cooperative.  Non-toxic appearance. He does not appear ill. No distress.   Uncomfortable on exam   HENT:   Head: Normocephalic and atraumatic. Head is without abrasion, without contusion and without laceration.   Right Ear: Hearing and external ear normal.   Left Ear: Hearing and external ear normal.   Eyes: Pupils are equal, round, and reactive to light. Conjunctivae, EOM and lids are normal. Right eye exhibits no discharge. Left eye exhibits no discharge. No scleral icterus.   Sclera clear bilat   Neck: Trachea normal, normal range of motion, full passive range of motion without pain and phonation normal. Neck supple. No spinous process tenderness and no muscular tenderness present. No neck rigidity. No tracheal deviation and normal range of motion present.   Cardiovascular: Normal rate, intact distal pulses and normal pulses.   Pulses:       Radial pulses are 2+ on the right side, and 2+ on the left side.        Dorsalis pedis pulses are 2+ on the right side, and 2+ on the left side.        Posterior tibial pulses are 2+ on the right side, and 2+ on the left side.   Pulmonary/Chest: Effort normal. No respiratory distress.   Abdominal: Normal appearance. He exhibits no pulsatile midline mass.   Musculoskeletal: He exhibits tenderness. He exhibits no edema or deformity.        Right shoulder: He exhibits decreased range of motion, tenderness and pain. He exhibits normal pulse and normal strength.        Left shoulder: He exhibits decreased range of motion, tenderness, bony tenderness and pain.        Right knee: He exhibits normal range of motion, no swelling and normal patellar mobility. Tenderness found. Medial joint line and lateral  joint line tenderness noted.        Arms:       Legs:  Neurological: He is alert and oriented to person, place, and time. He has normal strength. He displays normal reflexes. No cranial nerve deficit or sensory deficit. He exhibits normal muscle tone. He displays no seizure activity. Coordination normal. GCS eye subscore is 4. GCS verbal subscore is 5. GCS motor subscore is 6.   Skin: Skin is warm, dry and intact. Capillary refill takes less than 2 seconds. No abrasion, no bruising, no burn, no ecchymosis and no laceration noted. He is not diaphoretic. No pallor.   Psychiatric: He has a normal mood and affect. His speech is normal and behavior is normal. Judgment and thought content normal. Cognition and memory are normal.   Nursing note and vitals reviewed.    Xr Knee 3 View Right    Result Date: 7/3/2019  EXAMINATION: XR KNEE 3 VIEW RIGHT CLINICAL HISTORY: Civilian activity done for income or pay TECHNIQUE: AP, lateral, and Merchant views of the right knee were performed. COMPARISON: None FINDINGS: Skeletal structures at the right knee are intact.  Degenerative joint disease is evident with the small spurs at multiple positions and mild narrowing of the joint spaces.  No erosion or joint effusion is detected.  Surgical clips are noted in the leg.     No acute abnormality.  Mild DJD. Electronically signed by: Primo Martell MD Date:    07/03/2019 Time:    09:16    X-ray Shoulder Trauma 3 View Bilateral    Result Date: 7/3/2019  EXAMINATION: XR SHOULDER TRAUMA 3 VIEW BILATERAL CLINICAL HISTORY: Civilian activity done for income or pay TECHNIQUE: Three views of each shoulder were performed. COMPARISON: None FINDINGS: Skeletal structures are intact without fracture or dislocation.  Left AC joint is mildly widened consistent with a separation injury or possibly resection of the tip of the clavicle, age uncertain.  The glenohumeral joint spaces are not well projected.  Mild bony degenerative changes are present at  both shoulders.  No erosion or abnormal soft tissue calcification is seen.     Mild degenerative changes at both shoulders.  Mild widening at left AC joint, correlate for history of separation injury or surgery. Electronically signed by: Primo Martell MD Date:    07/03/2019 Time:    09:19  Assessment:       1. Work related injury    2. Acute pain of right knee    3. Dislocation of left shoulder joint, initial encounter    4. Strain of right shoulder, initial encounter        Plan:         Medications Ordered This Encounter   Medications    acetaminophen (TYLENOL) 650 MG TbSR     Sig: Take 1 tablet (650 mg total) by mouth every 8 (eight) hours.     Refill:  0    cyclobenzaprine (FLEXERIL) 10 MG tablet     Sig: Take 1 tablet (10 mg total) by mouth 3 (three) times daily as needed.     Dispense:  21 tablet     Refill:  0     Patient Instructions: Attention not to aggravate affected area, Daily home exercises/warm soaks, PT to be scheduled once authorized(please take tylenol for pain and discomfort)   Restrictions: Avoid frequent bending/lifting/twisting, No lifting/pushing/pulling more than 10 lbs, No above the shoulder/overhead work, Sit or stand as needed(07/03/19)  Follow up in about 1 week (around 7/10/2019).

## 2019-07-03 NOTE — LETTER
[facility] [address] [address]  Phone: [phone]  Fax: [phone]  [facility]: [email or web address]    Pt Name: Rangel Garcia  Injury Date: 03/20/2019   Employee ID:  Date of First Treatment: 07/03/2019   Company: [employer]      Appointment Time: 07:45 AM Arrived: 8am   Provider: Olivia Mobley NP Time Out:930am     Office Treatment:   1. Work related injury    2. Acute pain of right knee    3. Dislocation of left shoulder joint, initial encounter    4. Strain of right shoulder, initial encounter      Medications Ordered This Encounter   Medications    acetaminophen (TYLENOL) 650 MG TbSR      Patient Instructions: Attention not to aggravate affected area, Daily home exercises/warm soaks, PT to be scheduled once authorized(please take tylenol for pain and discomfort)    Restrictions: Avoid frequent bending/lifting/twisting, No lifting/pushing/pulling more than 10 lbs, No above the shoulder/overhead work, Sit or stand as needed(07/03/19)     Return Appointment: 07/10/19 at 10am

## 2020-12-22 ENCOUNTER — TELEPHONE (OUTPATIENT)
Dept: INTERNAL MEDICINE | Facility: CLINIC | Age: 55
End: 2020-12-22

## 2020-12-22 DIAGNOSIS — Z03.818 ENCOUNTER FOR OBSERVATION FOR SUSPECTED EXPOSURE TO OTHER BIOLOGICAL AGENTS RULED OUT: ICD-10-CM

## 2020-12-28 ENCOUNTER — LAB VISIT (OUTPATIENT)
Dept: INTERNAL MEDICINE | Facility: CLINIC | Age: 55
End: 2020-12-28
Payer: COMMERCIAL

## 2020-12-28 DIAGNOSIS — Z03.818 ENCOUNTER FOR OBSERVATION FOR SUSPECTED EXPOSURE TO OTHER BIOLOGICAL AGENTS RULED OUT: ICD-10-CM

## 2020-12-28 PROCEDURE — U0003 INFECTIOUS AGENT DETECTION BY NUCLEIC ACID (DNA OR RNA); SEVERE ACUTE RESPIRATORY SYNDROME CORONAVIRUS 2 (SARS-COV-2) (CORONAVIRUS DISEASE [COVID-19]), AMPLIFIED PROBE TECHNIQUE, MAKING USE OF HIGH THROUGHPUT TECHNOLOGIES AS DESCRIBED BY CMS-2020-01-R: HCPCS

## 2020-12-29 LAB — SARS-COV-2 RNA RESP QL NAA+PROBE: NOT DETECTED

## 2021-03-24 ENCOUNTER — HOSPITAL ENCOUNTER (OUTPATIENT)
Dept: RADIOLOGY | Facility: OTHER | Age: 56
Discharge: HOME | End: 2021-03-24
Attending: SPECIALIST
Payer: OTHER MISCELLANEOUS

## 2021-03-24 DIAGNOSIS — K43.9 VENTRAL HERNIA: ICD-10-CM

## 2021-03-24 PROCEDURE — 74176 CT ABD & PELVIS W/O CONTRAST: CPT | Mod: TC

## 2021-03-24 PROCEDURE — 74176 CT ABD & PELVIS W/O CONTRAST: CPT | Mod: 26,,, | Performed by: RADIOLOGY

## 2021-03-24 PROCEDURE — 74176 CT ABDOMEN PELVIS WITHOUT CONTRAST: ICD-10-PCS | Mod: 26,,, | Performed by: RADIOLOGY

## 2021-10-01 ENCOUNTER — IMMUNIZATION (OUTPATIENT)
Dept: INTERNAL MEDICINE | Facility: CLINIC | Age: 56
End: 2021-10-01
Payer: COMMERCIAL

## 2021-10-01 DIAGNOSIS — Z23 NEED FOR VACCINATION: Primary | ICD-10-CM

## 2021-10-01 PROCEDURE — 90471 IMMUNIZATION ADMIN: CPT | Mod: S$GLB,,, | Performed by: INTERNAL MEDICINE

## 2021-10-01 PROCEDURE — 91300 COVID-19, MRNA, LNP-S, PF, 30 MCG/0.3 ML DOSE VACCINE: CPT | Mod: PBBFAC | Performed by: INTERNAL MEDICINE

## 2021-10-01 PROCEDURE — 0003A COVID-19, MRNA, LNP-S, PF, 30 MCG/0.3 ML DOSE VACCINE: CPT | Mod: CV19,PBBFAC | Performed by: INTERNAL MEDICINE

## 2021-10-01 PROCEDURE — 90471 FLU VACCINE (QUAD) GREATER THAN OR EQUAL TO 3YO PRESERVATIVE FREE IM: ICD-10-PCS | Mod: S$GLB,,, | Performed by: INTERNAL MEDICINE

## 2021-10-01 PROCEDURE — 90686 FLU VACCINE (QUAD) GREATER THAN OR EQUAL TO 3YO PRESERVATIVE FREE IM: ICD-10-PCS | Mod: S$GLB,,, | Performed by: INTERNAL MEDICINE

## 2021-10-01 PROCEDURE — 90686 IIV4 VACC NO PRSV 0.5 ML IM: CPT | Mod: S$GLB,,, | Performed by: INTERNAL MEDICINE

## 2021-10-27 ENCOUNTER — PATIENT MESSAGE (OUTPATIENT)
Dept: SURGERY | Facility: CLINIC | Age: 56
End: 2021-10-27

## 2021-10-27 ENCOUNTER — OFFICE VISIT (OUTPATIENT)
Dept: SURGERY | Facility: CLINIC | Age: 56
End: 2021-10-27
Attending: SPECIALIST
Payer: COMMERCIAL

## 2021-10-27 VITALS
BODY MASS INDEX: 33.77 KG/M2 | HEART RATE: 92 BPM | SYSTOLIC BLOOD PRESSURE: 144 MMHG | DIASTOLIC BLOOD PRESSURE: 79 MMHG | WEIGHT: 228 LBS | HEIGHT: 69 IN | OXYGEN SATURATION: 97 %

## 2021-10-27 DIAGNOSIS — R22.1 MASS IN NECK: Primary | ICD-10-CM

## 2021-10-27 PROCEDURE — 1159F PR MEDICATION LIST DOCUMENTED IN MEDICAL RECORD: ICD-10-PCS | Mod: CPTII,S$GLB,, | Performed by: SPECIALIST

## 2021-10-27 PROCEDURE — 3078F PR MOST RECENT DIASTOLIC BLOOD PRESSURE < 80 MM HG: ICD-10-PCS | Mod: CPTII,S$GLB,, | Performed by: SPECIALIST

## 2021-10-27 PROCEDURE — 3077F SYST BP >= 140 MM HG: CPT | Mod: CPTII,S$GLB,, | Performed by: SPECIALIST

## 2021-10-27 PROCEDURE — 1159F MED LIST DOCD IN RCRD: CPT | Mod: CPTII,S$GLB,, | Performed by: SPECIALIST

## 2021-10-27 PROCEDURE — 99999 PR PBB SHADOW E&M-EST. PATIENT-LVL V: CPT | Mod: PBBFAC,,, | Performed by: SPECIALIST

## 2021-10-27 PROCEDURE — 99202 PR OFFICE/OUTPT VISIT, NEW, LEVL II, 15-29 MIN: ICD-10-PCS | Mod: S$GLB,,, | Performed by: SPECIALIST

## 2021-10-27 PROCEDURE — 1160F PR REVIEW ALL MEDS BY PRESCRIBER/CLIN PHARMACIST DOCUMENTED: ICD-10-PCS | Mod: CPTII,S$GLB,, | Performed by: SPECIALIST

## 2021-10-27 PROCEDURE — 99202 OFFICE O/P NEW SF 15 MIN: CPT | Mod: S$GLB,,, | Performed by: SPECIALIST

## 2021-10-27 PROCEDURE — 3078F DIAST BP <80 MM HG: CPT | Mod: CPTII,S$GLB,, | Performed by: SPECIALIST

## 2021-10-27 PROCEDURE — 3077F PR MOST RECENT SYSTOLIC BLOOD PRESSURE >= 140 MM HG: ICD-10-PCS | Mod: CPTII,S$GLB,, | Performed by: SPECIALIST

## 2021-10-27 PROCEDURE — 3008F PR BODY MASS INDEX (BMI) DOCUMENTED: ICD-10-PCS | Mod: CPTII,S$GLB,, | Performed by: SPECIALIST

## 2021-10-27 PROCEDURE — 99999 PR PBB SHADOW E&M-EST. PATIENT-LVL V: ICD-10-PCS | Mod: PBBFAC,,, | Performed by: SPECIALIST

## 2021-10-27 PROCEDURE — 3008F BODY MASS INDEX DOCD: CPT | Mod: CPTII,S$GLB,, | Performed by: SPECIALIST

## 2021-10-27 PROCEDURE — 1160F RVW MEDS BY RX/DR IN RCRD: CPT | Mod: CPTII,S$GLB,, | Performed by: SPECIALIST

## 2021-10-28 ENCOUNTER — HOSPITAL ENCOUNTER (OUTPATIENT)
Dept: RADIOLOGY | Facility: OTHER | Age: 56
Discharge: HOME | End: 2021-10-28
Attending: SPECIALIST
Payer: COMMERCIAL

## 2021-10-28 DIAGNOSIS — R22.1 MASS IN NECK: ICD-10-CM

## 2021-10-28 PROCEDURE — 76536 US EXAM OF HEAD AND NECK: CPT | Mod: 26,,, | Performed by: RADIOLOGY

## 2021-10-28 PROCEDURE — 76536 US SOFT TISSUE HEAD NECK THYROID: ICD-10-PCS | Mod: 26,,, | Performed by: RADIOLOGY

## 2021-10-28 PROCEDURE — 76536 US EXAM OF HEAD AND NECK: CPT | Mod: TC

## 2021-10-29 ENCOUNTER — TELEPHONE (OUTPATIENT)
Dept: SURGERY | Facility: CLINIC | Age: 56
End: 2021-10-29
Payer: COMMERCIAL

## 2022-02-22 ENCOUNTER — PATIENT MESSAGE (OUTPATIENT)
Dept: SURGERY | Facility: CLINIC | Age: 57
End: 2022-02-22
Payer: OTHER MISCELLANEOUS

## 2022-02-22 ENCOUNTER — OFFICE VISIT (OUTPATIENT)
Dept: SURGERY | Facility: CLINIC | Age: 57
End: 2022-02-22
Attending: SPECIALIST
Payer: COMMERCIAL

## 2022-02-22 VITALS
SYSTOLIC BLOOD PRESSURE: 144 MMHG | HEIGHT: 69 IN | BODY MASS INDEX: 33.33 KG/M2 | WEIGHT: 225 LBS | OXYGEN SATURATION: 98 % | DIASTOLIC BLOOD PRESSURE: 89 MMHG

## 2022-02-22 DIAGNOSIS — R10.9 LEFT SIDED ABDOMINAL PAIN: Primary | ICD-10-CM

## 2022-02-22 PROCEDURE — 99212 PR OFFICE/OUTPT VISIT, EST, LEVL II, 10-19 MIN: ICD-10-PCS | Mod: S$GLB,,, | Performed by: SPECIALIST

## 2022-02-22 PROCEDURE — 99212 OFFICE O/P EST SF 10 MIN: CPT | Mod: S$GLB,,, | Performed by: SPECIALIST

## 2022-02-22 PROCEDURE — 99999 PR PBB SHADOW E&M-EST. PATIENT-LVL V: ICD-10-PCS | Mod: PBBFAC,,, | Performed by: SPECIALIST

## 2022-02-22 PROCEDURE — 99999 PR PBB SHADOW E&M-EST. PATIENT-LVL V: CPT | Mod: PBBFAC,,, | Performed by: SPECIALIST
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
## 2022-03-02 ENCOUNTER — TELEPHONE (OUTPATIENT)
Dept: SURGERY | Facility: CLINIC | Age: 57
End: 2022-03-02
Payer: OTHER MISCELLANEOUS

## 2022-03-02 ENCOUNTER — HOSPITAL ENCOUNTER (OUTPATIENT)
Dept: RADIOLOGY | Facility: OTHER | Age: 57
Discharge: HOME | End: 2022-03-02
Attending: SPECIALIST
Payer: OTHER MISCELLANEOUS

## 2022-03-02 DIAGNOSIS — R10.9 LEFT SIDED ABDOMINAL PAIN: ICD-10-CM

## 2022-03-02 PROCEDURE — 76705 ECHO EXAM OF ABDOMEN: CPT | Mod: 26,,, | Performed by: RADIOLOGY

## 2022-03-02 PROCEDURE — 76705 ECHO EXAM OF ABDOMEN: CPT | Mod: TC

## 2022-03-02 PROCEDURE — 76705 US ABDOMEN LIMITED_HERNIA: ICD-10-PCS | Mod: 26,,, | Performed by: RADIOLOGY

## 2023-05-06 NOTE — TRANSFER OF CARE
"Anesthesia Transfer of Care Note    Patient: Rangel Garcia    Procedure(s) Performed: Procedure(s) (LRB):  REPAIR-HERNIA-VENTRAL-INITIAL - with mesh (N/A)  INSERTION-IMPLANT (N/A)  EXCISION-LESION - SKIN TAG FROM SCROTUM (N/A)    Patient location: PACU    Anesthesia Type: general    Transport from OR: Transported from OR on 2-3 L/min O2 by NC with adequate spontaneous ventilation    Post pain: adequate analgesia    Post assessment: no apparent anesthetic complications    Post vital signs: stable    Level of consciousness: awake, alert and oriented    Nausea/Vomiting: no nausea/vomiting    Complications: none    Transfer of care protocol was followed      Last vitals:   Visit Vitals  /85 (BP Location: Right arm, Patient Position: Sitting)   Pulse 68   Temp 36.7 °C (98.1 °F) (Oral)   Resp 16   Ht 5' 9" (1.753 m)   Wt 102.1 kg (225 lb)   SpO2 95%   BMI 33.23 kg/m²     "
no

## 2025-03-10 ENCOUNTER — OFFICE VISIT (OUTPATIENT)
Dept: PLASTIC SURGERY | Facility: CLINIC | Age: 60
End: 2025-03-10
Payer: COMMERCIAL

## 2025-03-10 VITALS
WEIGHT: 220 LBS | HEIGHT: 69 IN | SYSTOLIC BLOOD PRESSURE: 152 MMHG | BODY MASS INDEX: 32.58 KG/M2 | DIASTOLIC BLOOD PRESSURE: 95 MMHG | HEART RATE: 82 BPM

## 2025-03-10 DIAGNOSIS — R22.0 SCALP MASS: Primary | ICD-10-CM

## 2025-03-10 PROCEDURE — 99999 PR PBB SHADOW E&M-NEW PATIENT-LVL V: CPT | Mod: PBBFAC,,, | Performed by: SURGERY
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
## 2025-03-26 ENCOUNTER — PROCEDURE VISIT (OUTPATIENT)
Dept: PLASTIC SURGERY | Facility: CLINIC | Age: 60
End: 2025-03-26
Payer: COMMERCIAL

## 2025-03-26 VITALS
DIASTOLIC BLOOD PRESSURE: 76 MMHG | SYSTOLIC BLOOD PRESSURE: 117 MMHG | HEIGHT: 69 IN | WEIGHT: 210.44 LBS | BODY MASS INDEX: 31.17 KG/M2 | HEART RATE: 88 BPM

## 2025-03-26 DIAGNOSIS — R22.0 MASS OF SCALP: Primary | ICD-10-CM

## 2025-03-27 ENCOUNTER — OFFICE VISIT (OUTPATIENT)
Dept: PLASTIC SURGERY | Facility: CLINIC | Age: 60
End: 2025-03-27
Payer: COMMERCIAL

## 2025-03-27 VITALS — HEART RATE: 87 BPM | OXYGEN SATURATION: 97 % | SYSTOLIC BLOOD PRESSURE: 144 MMHG | DIASTOLIC BLOOD PRESSURE: 87 MMHG

## 2025-03-27 DIAGNOSIS — T14.8XXA HEMATOMA: Primary | ICD-10-CM

## 2025-03-27 DIAGNOSIS — R22.0 MASS OF SCALP: Primary | ICD-10-CM

## 2025-03-27 DIAGNOSIS — T14.8XXA HEMATOMA: ICD-10-CM

## 2025-03-27 PROCEDURE — 99999 PR PBB SHADOW E&M-EST. PATIENT-LVL III: CPT | Mod: PBBFAC,,, | Performed by: SURGERY

## 2025-03-27 RX ORDER — NALOXONE HYDROCHLORIDE 4 MG/.1ML
SPRAY NASAL
Qty: 1 EACH | Refills: 11 | Status: SHIPPED | OUTPATIENT
Start: 2025-03-27

## 2025-03-27 RX ORDER — LIDOCAINE HYDROCHLORIDE 10 MG/ML
5 INJECTION, SOLUTION INFILTRATION; PERINEURAL
Status: COMPLETED | OUTPATIENT
Start: 2025-03-27 | End: 2025-03-27

## 2025-03-27 RX ORDER — OXYCODONE AND ACETAMINOPHEN 5; 325 MG/1; MG/1
1 TABLET ORAL EVERY 6 HOURS PRN
Qty: 20 TABLET | Refills: 0 | Status: SHIPPED | OUTPATIENT
Start: 2025-03-27

## 2025-03-27 RX ORDER — OXYCODONE AND ACETAMINOPHEN 5; 325 MG/1; MG/1
1 TABLET ORAL EVERY 6 HOURS PRN
Qty: 20 TABLET | Refills: 0 | Status: SHIPPED | OUTPATIENT
Start: 2025-03-27 | End: 2025-03-27 | Stop reason: CLARIF

## 2025-03-27 RX ADMIN — LIDOCAINE HYDROCHLORIDE 5 ML: 10 INJECTION, SOLUTION INFILTRATION; PERINEURAL at 10:03

## 2025-03-27 NOTE — PROGRESS NOTES
Plastic & Reconstructive Surgery  Progress Note     S:  POD #1 s/p exc scalp mass.      No f/c  Lucas some sloshing overnight  Feels solid this AM.  Pain not controlled  No other issues.      O: BP (!) 144/87 (BP Location: Right arm, Patient Position: Sitting)   Pulse 87   SpO2 97%    Gen:  NAD, A&O x3  Scalp: organized fluid collection in surgical bed; slight bruising inferior to incision.      A/P: 59 y.o.  male POD #1 s/p exc scalp mass with hematoma     Drain in clinic  Compression  Will hopefully resorb on its own; if not, will need excision in OR  Cont eliquis for now  Pain meds  All questions answered. Patient verbalizes understanding and agreement with treatment plan.       Follow up:  as prev scheduled  Restriction: keep HOB elevated    After informed consent was obtained, scalp was prepped with alcohol. Dependent area was anesthetized and 18G needle placed into wound bed. 6 mL of liquefied hematoma was aspirated. Stick site was made hemostatic with pressure.  A pressure dressing was applied. Pt tolerated procedure without issue.      I spent 15 minutes on this patient encounter which included review of medical records.  Over half the time was spent with the patient face to face discussing the treatment plan, counseling and coordinating care.     Alberto Lofton MD  03/27/2025 9:37 AM     This document was transcribed using voice recognition software without a human . It may contain typographical, grammatical, and/or syntax errors.

## 2025-03-27 NOTE — PROCEDURES
Plastic, Reconstructive, and Hand Surgery  Operative Note     Patient: Rangel Garcia  MRN: [id number]   : 1965     Date of Surgery: 3/26/25     Preoperative Diagnosis:  Scalp mass       Postoperative Diagnosis:  Scalp mass     Procedure Performed:   Excision recurrent subfascial lipoma, 10 cm   Complex repair scalp, 9.2 cm     Attending Surgeon: Alberto Lofton MD     Anesthesia: Local     Key Findings:  Large recurrent lipoma within scar tissue     Estimated Blood Loss:  25 cc     Drains:  none     Implants:  none     Complications:  none     Specimens:  scalp mass     Indications for Procedure: Rangel Garcia  is a 59 y.o. male who presented to my clinic with history of scalp mass.  This was previously excised years recurred within a few years after that procedure.  It would become painful at times and hard to lay on that area. I recommended excision. Risks and rationale for this procedure were explained to him at length and he agreed to proceed.     Procedure in Detail:  The patient was seen in the procedure room.  The procedure site was verified with the patient.  Informed consent was obtained.  The patient was then positioned, prepped, and draped in sterile fashion.  A time-out procedure was then performed, where the patient was properly identified and there was verification of the procedure to be performed.     Attention was turned to the scalp.  There was a large mass noted in the left occipital scalp.  This measured 10 cm in greatest diameter.  The previous scar was marked out for excision of the areas infiltrated with 1% lidocaine with epinephrine solution.  After adequate anesthesia was obtained, incision was made with 15 blade scalpel to excise the previous scar.  Dissection was carried down through subcutaneous tissue until the mass in question was identified.  This was consistent with lipoma.  This traversed deep to the galea and was mostly within the loose areolar tissue.  This was  carefully dissected free from all surrounding soft tissue and carefully excised and passed off the field for permanent sectioning.  Meticulous hemostasis was achieved using cautery and direct pressure.  The wound was irrigated copiously until the effluent was clear.  Hemostasis was excellent.  The skin edges were then reapproximated in multiple layers using 3-0 Monocryl suture and to reapproximate the galea, followed by 3-0 Monocryl suture in deep dermal fashion, followed by 5 0 chromic suture in running continuous fashion.  Total length of the repair was 9.2 cm     Needle, instrument, and sponge count was correct at this time.  Sterile dressings were applied, consisting of Dermabond.  Patient tolerated the procedure in its entirety without complication.  The patient is to be discharged home in stable condition when criteria met.  The patient was given specific wound care instructions and precautions.  All questions were answered.     Post-operative Plan:  Stable, to home.    Follow up:  2 weeks  Restrictions:  Light activity, head of bed elevated.     Attestation of Presence:  I was present for the entirety of the procedure.     Alberto Lofton MD  03/27/2025 4:01 PM     This document was transcribed using voice recognition software without a human . It may contain typographical, grammatical, and/or syntax errors.

## 2025-04-01 ENCOUNTER — OFFICE VISIT (OUTPATIENT)
Dept: PLASTIC SURGERY | Facility: CLINIC | Age: 60
End: 2025-04-01
Payer: COMMERCIAL

## 2025-04-01 DIAGNOSIS — T14.8XXA HEMATOMA: Primary | ICD-10-CM

## 2025-04-01 DIAGNOSIS — R22.0 SCALP MASS: ICD-10-CM

## 2025-04-01 PROCEDURE — 99999 PR PBB SHADOW E&M-EST. PATIENT-LVL IV: CPT | Mod: PBBFAC,,, | Performed by: SURGERY

## 2025-04-01 PROCEDURE — 1159F MED LIST DOCD IN RCRD: CPT | Mod: CPTII,S$GLB,, | Performed by: SURGERY

## 2025-04-01 PROCEDURE — 99024 POSTOP FOLLOW-UP VISIT: CPT | Mod: S$GLB,,, | Performed by: SURGERY

## 2025-04-01 PROCEDURE — 1160F RVW MEDS BY RX/DR IN RCRD: CPT | Mod: CPTII,S$GLB,, | Performed by: SURGERY

## 2025-04-03 NOTE — PROGRESS NOTES
Plastic & Reconstructive Surgery  Progress Note     S:  Pt last seen 1 wk ago.  He is now 1 wk s/p exc scalp mass.      Overall ok.   Hears some sloshing still.   Leaking overnight - using pads.   No f/c  Pain improved.   Feels like swelling improved.   No other issues.      O: There were no vitals taken for this visit.   Gen:  NAD, A&O x3  Scalp: fullness to posterior scalp; slight bruising inferior to incision. Incision otherwise c/d/I.      A/P: 59 y.o.  male 1 wk s/p exc scalp mass with hematoma    Overall improved.   Still with residual fullness - likely to resorb on its own.   Cont compression.   All questions answered. Patient verbalizes understanding and agreement with treatment plan.       Follow up: 2 wks  Restriction: keep HOB elevated     I spent 15 minutes on this patient encounter which included review of medical records.  Over half the time was spent with the patient face to face discussing the treatment plan, counseling and coordinating care.     Alberto Lofton MD  04/03/2025 9:37 AM     This document was transcribed using voice recognition software without a human . It may contain typographical, grammatical, and/or syntax errors.

## 2025-04-15 ENCOUNTER — OFFICE VISIT (OUTPATIENT)
Dept: PLASTIC SURGERY | Facility: CLINIC | Age: 60
End: 2025-04-15
Payer: COMMERCIAL

## 2025-04-15 DIAGNOSIS — R22.0 MASS OF SCALP: Primary | ICD-10-CM

## 2025-04-15 PROCEDURE — 99999 PR PBB SHADOW E&M-EST. PATIENT-LVL IV: CPT | Mod: PBBFAC,,, | Performed by: SURGERY

## 2025-04-15 RX ORDER — PREGABALIN 75 MG/1
75 CAPSULE ORAL 2 TIMES DAILY
Qty: 180 CAPSULE | Refills: 2 | Status: SHIPPED | OUTPATIENT
Start: 2025-04-15 | End: 2026-01-10

## 2025-04-21 NOTE — PROGRESS NOTES
Plastic & Reconstructive Surgery  Progress Note     S:  Pt last seen 2 wk ago.  He is now 3 wk s/p exc scalp mass.      Overall ok.   Less fullness noted.   Still with pain - mostly jolts across his scalp.   Knight to shave his head / apply pressure there.   No other issues.      O: There were no vitals taken for this visit.   Gen:  NAD, A&O x3  Scalp: fullness and bruising resolved. Incision healing well. +Tinel over L occipital scalp extending to vertex. No hematoma / seroma.      A/P: 59 y.o.  male 3 wk s/p exc scalp mass with hematoma    Overall improved.   Fullness has resolved but now so with significant nerve pain.    He likely has irritation of his greater occipital nerve.  We will start Lyrica and see if there was any improvement with this.  If not, he may require exploration.  All questions answered. Patient verbalizes understanding and agreement with treatment plan.       Follow up: 4 wks  Restriction: keep HOB elevated     I spent 15 minutes on this patient encounter which included review of medical records.  Over half the time was spent with the patient face to face discussing the treatment plan, counseling and coordinating care.     Alberto Lofton MD  04/21/2025 9:37 AM     This document was transcribed using voice recognition software without a human . It may contain typographical, grammatical, and/or syntax errors.

## 2025-05-13 ENCOUNTER — OFFICE VISIT (OUTPATIENT)
Dept: PLASTIC SURGERY | Facility: CLINIC | Age: 60
End: 2025-05-13
Payer: COMMERCIAL

## 2025-05-13 DIAGNOSIS — R22.0 MASS OF SCALP: ICD-10-CM

## 2025-05-13 PROCEDURE — 99999 PR PBB SHADOW E&M-EST. PATIENT-LVL IV: CPT | Mod: PBBFAC,,, | Performed by: SURGERY

## 2025-05-13 PROCEDURE — 1159F MED LIST DOCD IN RCRD: CPT | Mod: CPTII,S$GLB,, | Performed by: SURGERY

## 2025-05-13 PROCEDURE — 99024 POSTOP FOLLOW-UP VISIT: CPT | Mod: S$GLB,,, | Performed by: SURGERY

## 2025-05-13 PROCEDURE — 1160F RVW MEDS BY RX/DR IN RCRD: CPT | Mod: CPTII,S$GLB,, | Performed by: SURGERY

## 2025-05-13 RX ORDER — PREGABALIN 100 MG/1
100 CAPSULE ORAL 2 TIMES DAILY
Qty: 180 CAPSULE | Refills: 2 | Status: SHIPPED | OUTPATIENT
Start: 2025-05-13 | End: 2026-02-07

## 2025-05-19 NOTE — PROGRESS NOTES
Plastic & Reconstructive Surgery  Progress Note     S:  Pt last seen 3 wk ago.  He is now 6 wk s/p exc scalp mass.      Overall ok.   Feels like lyrica is working.   Pain now 6-7/10.   Notably with numbness to scalp  No other issues.      O: There were no vitals taken for this visit.   Gen:  NAD, A&O x3  Scalp: Incision healing well. +Tinel over L occipital scalp extending to vertex. Numbness over left occipital to vertex scalp.      A/P: 59 y.o.  male 6 wk s/p exc scalp mass with hematoma    Overall improved.   Lyrica is helping - plan to increase dose to 100 bid.   Nerve block today - if significant improvement can discuss exploration and grafting vs TMR.   All questions answered. Patient verbalizes understanding and agreement with treatment plan.       Follow up: 1 mo  Restriction: none    After informed consent was obtained, the patient's occipital scalp was prepped with alcohol. 5 mL 1% lidocaine were injected into the occipital scalp in the area of maximal tenderness.  Hemostasis was excellent.  A Band-Aid was applied.  Patient tolerated the procedure without issue.       I spent 15 minutes on this patient encounter which included review of medical records.  Over half the time was spent with the patient face to face discussing the treatment plan, counseling and coordinating care.     Alberto Lofton MD  05/19/2025 9:37 AM     This document was transcribed using voice recognition software without a human . It may contain typographical, grammatical, and/or syntax errors.

## 2025-06-10 ENCOUNTER — OFFICE VISIT (OUTPATIENT)
Dept: PLASTIC SURGERY | Facility: CLINIC | Age: 60
End: 2025-06-10
Payer: COMMERCIAL

## 2025-06-10 VITALS
SYSTOLIC BLOOD PRESSURE: 124 MMHG | OXYGEN SATURATION: 97 % | DIASTOLIC BLOOD PRESSURE: 73 MMHG | HEART RATE: 89 BPM | TEMPERATURE: 98 F | RESPIRATION RATE: 18 BRPM

## 2025-06-10 DIAGNOSIS — G56.22 CUBITAL TUNNEL SYNDROME ON LEFT: ICD-10-CM

## 2025-06-10 DIAGNOSIS — G56.22 ULNAR TUNNEL SYNDROME, LEFT: ICD-10-CM

## 2025-06-10 DIAGNOSIS — M54.81 OCCIPITAL NEURALGIA OF LEFT SIDE: Primary | ICD-10-CM

## 2025-06-10 PROCEDURE — 3078F DIAST BP <80 MM HG: CPT | Mod: CPTII,S$GLB,, | Performed by: SURGERY

## 2025-06-10 PROCEDURE — 1159F MED LIST DOCD IN RCRD: CPT | Mod: CPTII,S$GLB,, | Performed by: SURGERY

## 2025-06-10 PROCEDURE — 99213 OFFICE O/P EST LOW 20 MIN: CPT | Mod: 24,S$GLB,, | Performed by: SURGERY

## 2025-06-10 PROCEDURE — 1160F RVW MEDS BY RX/DR IN RCRD: CPT | Mod: CPTII,S$GLB,, | Performed by: SURGERY

## 2025-06-10 PROCEDURE — 99999 PR PBB SHADOW E&M-EST. PATIENT-LVL V: CPT | Mod: PBBFAC,,, | Performed by: SURGERY

## 2025-06-10 PROCEDURE — 3074F SYST BP LT 130 MM HG: CPT | Mod: CPTII,S$GLB,, | Performed by: SURGERY
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
## 2025-06-11 DIAGNOSIS — S54.02XA DAMAGE TO LEFT ULNAR NERVE, INITIAL ENCOUNTER: Primary | ICD-10-CM

## 2025-06-11 DIAGNOSIS — R51.9 PAIN OF SCALP: ICD-10-CM

## 2025-06-24 ENCOUNTER — PATIENT MESSAGE (OUTPATIENT)
Dept: PLASTIC SURGERY | Facility: CLINIC | Age: 60
End: 2025-06-24
Payer: COMMERCIAL

## (undated) DEVICE — SEE MEDLINE ITEM 156930

## (undated) DEVICE — CLOSURE SKIN STERI STRIP 1/2X4

## (undated) DEVICE — EVACUATOR WOUND BULB 100CC

## (undated) DEVICE — SUT VICRYL PLUS 2-0 CT1 18

## (undated) DEVICE — APPLICATOR CHLORAPREP ORN 26ML

## (undated) DEVICE — NDL SPINAL SPINOCAN 22GX3.5

## (undated) DEVICE — DRAPE THYROID WITH ARMBOARD

## (undated) DEVICE — GOWN SMART IMP BREATHABLE XXLG

## (undated) DEVICE — SWAB CULTURETTE II DUAL

## (undated) DEVICE — COLLECTOR SPECIMEN ANAEROBIC

## (undated) DEVICE — SOL 9P NACL IRR PIC IL

## (undated) DEVICE — GLOVE BIOGEL SKINSENSE PI 6.0

## (undated) DEVICE — GLOVE BIOGEL SKINSENSE PI 8.0

## (undated) DEVICE — SEE MEDLINE ITEM 152622

## (undated) DEVICE — SYR ONLY LUER LOCK 20CC

## (undated) DEVICE — ELECTRODE REM PLYHSV RETURN 9

## (undated) DEVICE — BLADE SURG STAINLESS STEEL #10

## (undated) DEVICE — SUT SILK 2.0 BLK 18

## (undated) DEVICE — SYR B-D DISP CONTROL 10CC100/C

## (undated) DEVICE — DRESSING TRANS 4X10 TEGADERM

## (undated) DEVICE — NDL HYPO REG 25G X 1 1/2

## (undated) DEVICE — SUT VICRYL PLUS 3-0 SH 18IN

## (undated) DEVICE — SEE MEDLINE ITEM 157148

## (undated) DEVICE — SUT ETHIBOND EXCEL 1 CTX 18

## (undated) DEVICE — GLOVE BIOGEL SKINSENSE PI 6.5

## (undated) DEVICE — SPONGE LAP 18X18 PREWASHED

## (undated) DEVICE — PAD ABD 8X10 STERILE

## (undated) DEVICE — ADHESIVE DERMABOND ADVANCED

## (undated) DEVICE — SYS CLSR DERMABOND PRINEO 22CM

## (undated) DEVICE — GAUZE SPONGE 4X4 12PLY

## (undated) DEVICE — SUT MCRYL PLUS 4-0 PS2 27IN

## (undated) DEVICE — BINDER ABDOMINAL 9 46-62